# Patient Record
Sex: FEMALE | Race: WHITE | Employment: OTHER | URBAN - METROPOLITAN AREA
[De-identification: names, ages, dates, MRNs, and addresses within clinical notes are randomized per-mention and may not be internally consistent; named-entity substitution may affect disease eponyms.]

---

## 2017-01-16 RX ORDER — PIOGLITAZONE HCL AND METFORMIN HCL 500; 15 MG/1; MG/1
TABLET ORAL
Qty: 180 TAB | Refills: 0 | Status: SHIPPED | OUTPATIENT
Start: 2017-01-16 | End: 2017-03-31 | Stop reason: SDUPTHER

## 2017-01-16 RX ORDER — LOSARTAN POTASSIUM 25 MG/1
TABLET ORAL
Qty: 90 TAB | Refills: 0 | Status: SHIPPED | OUTPATIENT
Start: 2017-01-16 | End: 2017-03-31 | Stop reason: SDUPTHER

## 2017-01-16 RX ORDER — LEVOTHYROXINE SODIUM 75 UG/1
TABLET ORAL
Qty: 90 TAB | Refills: 0 | Status: SHIPPED | OUTPATIENT
Start: 2017-01-16 | End: 2017-03-31 | Stop reason: SDUPTHER

## 2017-03-31 ENCOUNTER — OFFICE VISIT (OUTPATIENT)
Dept: FAMILY MEDICINE CLINIC | Age: 62
End: 2017-03-31

## 2017-03-31 VITALS
TEMPERATURE: 97.2 F | OXYGEN SATURATION: 99 % | SYSTOLIC BLOOD PRESSURE: 132 MMHG | RESPIRATION RATE: 20 BRPM | HEIGHT: 64 IN | WEIGHT: 168.2 LBS | HEART RATE: 81 BPM | DIASTOLIC BLOOD PRESSURE: 80 MMHG | BODY MASS INDEX: 28.71 KG/M2

## 2017-03-31 DIAGNOSIS — E03.4 HYPOTHYROIDISM DUE TO ACQUIRED ATROPHY OF THYROID: ICD-10-CM

## 2017-03-31 DIAGNOSIS — E78.00 PURE HYPERCHOLESTEROLEMIA: ICD-10-CM

## 2017-03-31 DIAGNOSIS — E11.49 OTHER DIABETIC NEUROLOGICAL COMPLICATION ASSOCIATED WITH TYPE 2 DIABETES MELLITUS (HCC): Primary | ICD-10-CM

## 2017-03-31 DIAGNOSIS — I10 ESSENTIAL HYPERTENSION: ICD-10-CM

## 2017-03-31 DIAGNOSIS — M54.2 NECK PAIN: ICD-10-CM

## 2017-03-31 RX ORDER — LOSARTAN POTASSIUM 25 MG/1
TABLET ORAL
Qty: 90 TAB | Refills: 2 | Status: SHIPPED | OUTPATIENT
Start: 2017-03-31 | End: 2017-08-25 | Stop reason: ALTCHOICE

## 2017-03-31 RX ORDER — LEVOTHYROXINE SODIUM 75 UG/1
TABLET ORAL
Qty: 90 TAB | Refills: 2 | Status: SHIPPED | OUTPATIENT
Start: 2017-03-31 | End: 2018-02-08 | Stop reason: SDUPTHER

## 2017-03-31 RX ORDER — PIOGLITAZONE HCL AND METFORMIN HCL 500; 15 MG/1; MG/1
TABLET ORAL
Qty: 180 TAB | Refills: 2 | Status: SHIPPED | OUTPATIENT
Start: 2017-03-31 | End: 2017-08-25 | Stop reason: ALTCHOICE

## 2017-03-31 RX ORDER — SIMVASTATIN 40 MG/1
TABLET, FILM COATED ORAL
Qty: 90 TAB | Refills: 2 | Status: SHIPPED | OUTPATIENT
Start: 2017-03-31 | End: 2018-04-11 | Stop reason: SDUPTHER

## 2017-03-31 NOTE — PATIENT INSTRUCTIONS

## 2017-03-31 NOTE — PROGRESS NOTES
Sofie Degroot is a 62 y.o. female  Chief Complaint   Patient presents with    Diabetes     4 months follow up     1. Have you been to the ER, urgent care clinic since your last visit? Hospitalized since your last visit? No    2. Have you seen or consulted any other health care providers outside of the [address] [place] [address] since your last visit? Include any pap smears or colon screening.  No

## 2017-03-31 NOTE — MR AVS SNAPSHOT
Visit Information Date & Time Provider Department Dept. Phone Encounter #  
 3/31/2017  8:30 AM Claudeen Figures, [employer] [phone] [id number] Upcoming Health Maintenance Date Due Hepatitis C Screening 1955 FOOT EXAM Q1 6/23/2016 ZOSTER VACCINE AGE 60> 12/12/2016 EYE EXAM RETINAL OR DILATED Q1 3/16/2017 HEMOGLOBIN A1C Q6M 5/3/2017 BREAST CANCER SCRN MAMMOGRAM 8/14/2017 MICROALBUMIN Q1 11/3/2017 LIPID PANEL Q1 11/3/2017 PAP AKA CERVICAL CYTOLOGY 10/5/2019 COLONOSCOPY 8/23/2023 DTaP/Tdap/Td series (2 - Td) 6/23/2025 Allergies as of 3/31/2017  Review Complete On: 3/31/2017 By: Leonidas Carmichael LPN Severity Noted Reaction Type Reactions Ace Inhibitors High 10/20/2010   Systemic Anaphylaxis Anectine [Succinylcholine Chloride] High 10/20/2010    Shortness of Breath With rash Iodinated Contrast Media - Oral And Iv Dye High 10/20/2010   Systemic Hives Vicodin [Hydrocodone-acetaminophen]  10/14/2014    Itching Current Immunizations  Reviewed on 6/23/2015 Name Date Influenza Vaccine 10/29/2015 Influenza Vaccine (Quad) PF 11/15/2016  3:57 PM  
 Influenza Vaccine Split 11/1/2012 Pneumococcal Polysaccharide (PPSV-23) 11/15/2016  3:56 PM  
 Tdap 6/23/2015  3:43 PM  
  
 Not reviewed this visit You Were Diagnosed With   
  
 Codes Comments Essential hypertension    -  Primary ICD-10-CM: I10 
ICD-9-CM: 401.9 Pure hypercholesterolemia     ICD-10-CM: E78.00 ICD-9-CM: 272.0 Hypothyroidism due to acquired atrophy of thyroid     ICD-10-CM: E03.4 ICD-9-CM: 244.8, 246.8 Neck pain     ICD-10-CM: M54.2 ICD-9-CM: 723.1 Other diabetic neurological complication associated with type 2 diabetes mellitus ([facility]     ICD-10-CM: E11.49 Vitals BP Pulse Temp Resp Height(growth percentile) Weight(growth percentile) 132/80 (BP 1 Location: Right arm, BP Patient Position: Sitting) 81 97.2 °F (36.2 °C) (Temporal) 20 5' 4\" (1.626 m) 168 lb 3.2 oz (76.3 kg) LMP SpO2 BMI OB Status Smoking Status 10/16/2010 99% 28.87 kg/m2 Hysterectomy Former Smoker Vitals History BMI and BSA Data Body Mass Index Body Surface Area  
 28.87 kg/m 2 1.86 m 2 Preferred Pharmacy Pharmacy Name Phone [address] [facility] [address]  [place] [facility] [phone] Your Updated Medication List  
  
   
This list is accurate as of: 3/31/17  9:05 AM.  Always use your most recent med list.  
  
  
  
  
 * albuterol 90 mcg/actuation inhaler Commonly known as:  PROAIR HFA Take 2 Puffs by inhalation every six (6) hours as needed for Wheezing or Shortness of Breath. * albuterol 90 mcg/actuation inhaler Commonly known as:  PROVENTIL HFA, VENTOLIN HFA, PROAIR HFA Take 2 Puffs by inhalation every six (6) hours as needed for Wheezing. aspirin 81 mg tablet Take  by mouth daily. CINNAMON 500 mg Cap Generic drug:  cinnamon bark Take  by mouth. furosemide 20 mg tablet Commonly known as:  LASIX Take 1 Tab by mouth daily as needed. gabapentin 300 mg capsule Commonly known as:  NEURONTIN  
TAKE 2 CAPSULES BY MOUTH THREE TIMES DAILY  
  
 levothyroxine 75 mcg tablet Commonly known as:  SYNTHROID  
TAKE 1 TABLET BY MOUTH DAILY BEFORE BREAKFAST  
  
 losartan 25 mg tablet Commonly known as:  COZAAR  
TAKE 1 TABLET BY MOUTH DAILY pioglitazone-metFORMIN  mg per tablet Commonly known as:  ACTOPLUS MET  
TAKE 1 TABLET BY MOUTH TWICE DAILY WITH MEALS  
  
 PRILOSEC PO Take  by mouth. simvastatin 40 mg tablet Commonly known as:  ZOCOR  
TAKE 1 TABLET BY MOUTH EVERY EVENING  
  
 TURMERIC ROOT EXTRACT PO Take  by mouth. VITAMIN B-12 2,500 mcg sublingual tablet Generic drug:  cyanocobalamin Take 2,500 mcg by mouth daily. * Notice: This list has 2 medication(s) that are the same as other medications prescribed for you. Read the directions carefully, and ask your doctor or other care provider to review them with you. Prescriptions Sent to Pharmacy Refills  
 pioglitazone-metFORMIN (ACTOPLUS MET)  mg per tablet 2 Sig: TAKE 1 TABLET BY MOUTH TWICE DAILY WITH MEALS Class: Normal  
 Pharmacy: [facility] [place] [id number] [facility] [address] [facility] [place] Ph #: [phone]  
 levothyroxine (SYNTHROID) 75 mcg tablet 2 Sig: TAKE 1 TABLET BY MOUTH DAILY BEFORE BREAKFAST Class: Normal  
 Pharmacy: [facility] [place] [address] [facility] [address] [facility] [address] & [facility] [place] Ph #: [phone]  
 simvastatin (ZOCOR) 40 mg tablet 2 Sig: TAKE 1 TABLET BY MOUTH EVERY EVENING Class: Normal  
 Pharmacy: [facility] [address] [facility] [address] & [facility] [place] Ph #: [phone]  
 losartan (COZAAR) 25 mg tablet 2 Sig: TAKE 1 TABLET BY MOUTH DAILY Class: Normal  
 Pharmacy: [facility] [address] [place], [address] Ph #: [phone] We Performed the Following  DIABETES FOOT EXAM [HM7 Custom] REFERRAL TO OPHTHALMOLOGY [REF57 Custom] Comments:  
 DO NOT SCHEDULE. Pt will make appt with [facility]. Referral Information Referral ID Referred By Referred To  
  
 [id number] [facility] [address] [place], [address] Visits Status Start Date End Date 1 New Request 3/31/17 3/31/18 If your referral has a status of pending review or denied, additional information will be sent to support the outcome of this decision. Patient Instructions Learning About Diabetes Food Guidelines Your Care Instructions Meal planning is important to manage diabetes. It helps keep your blood sugar at a target level (which you set with your doctor). You don't have to eat special foods. You can eat what your family eats, including sweets once in a while. But you do have to pay attention to how often you eat and how much you eat of certain foods. You may want to work with a dietitian or a certified diabetes educator (CDE) to help you plan meals and snacks. A dietitian or CDE can also help you lose weight if that is one of your goals. What should you know about eating carbs? Managing the amount of carbohydrate (carbs) you eat is an important part of healthy meals when you have diabetes. Carbohydrate is found in many foods. · Learn which foods have carbs. And learn the amounts of carbs in different foods. ¨ Bread, cereal, pasta, and rice have about 15 grams of carbs in a serving. A serving is 1 slice of bread (1 ounce), ½ cup of cooked cereal, or 1/3 cup of cooked pasta or rice. ¨ Fruits have 15 grams of carbs in a serving. A serving is 1 small fresh fruit, such as an apple or orange; ½ of a banana; ½ cup of cooked or canned fruit; ½ cup of fruit juice; 1 cup of melon or raspberries; or 2 tablespoons of dried fruit. ¨ Milk and no-sugar-added yogurt have 15 grams of carbs in a serving. A serving is 1 cup of milk or 2/3 cup of no-sugar-added yogurt. ¨ Starchy vegetables have 15 grams of carbs in a serving. A serving is ½ cup of mashed potatoes or sweet potato; 1 cup winter squash; ½ of a small baked potato; ½ cup of cooked beans; or ½ cup cooked corn or green peas. · Learn how much carbs to eat each day and at each meal. A dietitian or CDE can teach you how to keep track of the amount of carbs you eat. This is called carbohydrate counting.  
· If you are not sure how to count carbohydrate grams, use the Plate Method to plan meals. It is a good, quick way to make sure that you have a balanced meal. It also helps you spread carbs throughout the day. ¨ Divide your plate by types of foods. Put non-starchy vegetables on half the plate, meat or other protein food on one-quarter of the plate, and a grain or starchy vegetable in the final quarter of the plate. To this you can add a small piece of fruit and 1 cup of milk or yogurt, depending on how many carbs you are supposed to eat at a meal. 
· Try to eat about the same amount of carbs at each meal. Do not \"save up\" your daily allowance of carbs to eat at one meal. 
· Proteins have very little or no carbs per serving. Examples of proteins are beef, chicken, turkey, fish, eggs, tofu, cheese, cottage cheese, and peanut butter. A serving size of meat is 3 ounces, which is about the size of a deck of cards. Examples of meat substitute serving sizes (equal to 1 ounce of meat) are 1/4 cup of cottage cheese, 1 egg, 1 tablespoon of peanut butter, and ½ cup of tofu. How can you eat out and still eat healthy? · Learn to estimate the serving sizes of foods that have carbohydrate. If you measure food at home, it will be easier to estimate the amount in a serving of restaurant food. · If the meal you order has too much carbohydrate (such as potatoes, corn, or baked beans), ask to have a low-carbohydrate food instead. Ask for a salad or green vegetables. · If you use insulin, check your blood sugar before and after eating out to help you plan how much to eat in the future. · If you eat more carbohydrate at a meal than you had planned, take a walk or do other exercise. This will help lower your blood sugar. What else should you know? · Limit saturated fat, such as the fat from meat and dairy products. This is a healthy choice because people who have diabetes are at higher risk of heart disease.  So choose lean cuts of meat and nonfat or low-fat dairy products. Use olive or canola oil instead of butter or shortening when cooking. · Don't skip meals. Your blood sugar may drop too low if you skip meals and take insulin or certain medicines for diabetes. · Check with your doctor before you drink alcohol. Alcohol can cause your blood sugar to drop too low. Alcohol can also cause a bad reaction if you take certain diabetes medicines. Follow-up care is a key part of your treatment and safety. Be sure to make and go to all appointments, and call your doctor if you are having problems. It's also a good idea to know your test results and keep a list of the medicines you take. Where can you learn more? Go to http://junito-grace.info/. Enter Q944 in the search box to learn more about \"Learning About Diabetes Food Guidelines. \" Current as of: May 23, 2016 Content Version: 11.2 © 5323-3099 [employer]. Care instructions adapted under license by [employer] (which disclaims liability or warranty for this information). If you have questions about a medical condition or this instruction, always ask your healthcare professional. [address] any warranty or liability for your use of this information. Introducing [facility]! Quynh Kan introduces [employer] patient portal. Now you can access parts of your medical record, email your doctor's office, and request medication refills online. 1. In your internet browser, go to [email or web address] 2. Click on the First Time User? Click Here link in the Sign In box. You will see the New Member Sign Up page. 3. Enter your [employer] Access Code exactly as it appears below. You will not need to use this code after youve completed the sign-up process. If you do not sign up before the expiration date, you must request a new code. · [employer] Access Code: [email or web address] Expires: 6/29/2017  9:05 AM 
 
 4. Enter the last four digits of your Social Security Number (xxxx) and Date of Birth (mm/dd/yyyy) as indicated and click Submit. You will be taken to the next sign-up page. 5. Create a Reasult ID. This will be your Reasult login ID and cannot be changed, so think of one that is secure and easy to remember. 6. Create a Reasult password. You can change your password at any time. 7. Enter your Password Reset Question and Answer. This can be used at a later time if you forget your password. 8. Enter your e-mail address. You will receive e-mail notification when new information is available in [address] 9. Click Sign Up. You can now view and download portions of your medical record. 10. Click the Download Summary menu link to download a portable copy of your medical information. If you have questions, please visit the Frequently Asked Questions section of the Reasult website. Remember, Reasult is NOT to be used for urgent needs. For medical emergencies, dial 911. Now available from your iPhone and Android! Please provide this summary of care documentation to your next provider. Your primary care clinician is listed as Steph 51. If you have any questions after today's visit, please call [phone].

## 2017-04-01 LAB
AVG GLU, 10930: 139 MG/DL (ref 91–123)
HBA1C MFR BLD HPLC: 6.5 % (ref 4.8–5.9)
HCV AB SER IA-ACNC: NORMAL

## 2017-04-04 ENCOUNTER — TELEPHONE (OUTPATIENT)
Dept: FAMILY MEDICINE CLINIC | Age: 62
End: 2017-04-04

## 2017-08-25 ENCOUNTER — OFFICE VISIT (OUTPATIENT)
Dept: FAMILY MEDICINE CLINIC | Age: 62
End: 2017-08-25

## 2017-08-25 VITALS
WEIGHT: 165 LBS | BODY MASS INDEX: 28.17 KG/M2 | SYSTOLIC BLOOD PRESSURE: 120 MMHG | OXYGEN SATURATION: 99 % | DIASTOLIC BLOOD PRESSURE: 80 MMHG | RESPIRATION RATE: 16 BRPM | TEMPERATURE: 98.1 F | HEART RATE: 66 BPM | HEIGHT: 64 IN

## 2017-08-25 DIAGNOSIS — G89.29 CHRONIC LEFT SHOULDER PAIN: ICD-10-CM

## 2017-08-25 DIAGNOSIS — E11.49 OTHER DIABETIC NEUROLOGICAL COMPLICATION ASSOCIATED WITH TYPE 2 DIABETES MELLITUS (HCC): ICD-10-CM

## 2017-08-25 DIAGNOSIS — M25.60 JOINT MOVEMENT RESTRICTED: ICD-10-CM

## 2017-08-25 DIAGNOSIS — M19.012 OSTEOARTHRITIS OF LEFT SHOULDER, UNSPECIFIED OSTEOARTHRITIS TYPE: ICD-10-CM

## 2017-08-25 DIAGNOSIS — M25.512 CHRONIC LEFT SHOULDER PAIN: ICD-10-CM

## 2017-08-25 DIAGNOSIS — E78.00 PURE HYPERCHOLESTEROLEMIA: ICD-10-CM

## 2017-08-25 DIAGNOSIS — E11.49 OTHER DIABETIC NEUROLOGICAL COMPLICATION ASSOCIATED WITH TYPE 2 DIABETES MELLITUS (HCC): Primary | ICD-10-CM

## 2017-08-25 DIAGNOSIS — I10 ESSENTIAL HYPERTENSION: ICD-10-CM

## 2017-08-25 LAB — HBA1C MFR BLD HPLC: 5.9 %

## 2017-08-25 RX ORDER — GABAPENTIN 300 MG/1
CAPSULE ORAL
Qty: 180 CAP | Refills: 5 | Status: SHIPPED | OUTPATIENT
Start: 2017-08-25 | End: 2018-03-02 | Stop reason: SDUPTHER

## 2017-08-25 NOTE — PATIENT INSTRUCTIONS

## 2017-08-25 NOTE — PROGRESS NOTES
Assessment/Plan:    *Diagnoses and all orders for this visit:    1. Other diabetic neurological complication associated with type 2 diabetes mellitus (HCC)  -     AMB POC HEMOGLOBIN A1C  -     CBC WITH AUTOMATED DIFF; Future  -     HEPATIC FUNCTION PANEL; Future  -     LIPID PANEL; Future  -     METABOLIC PANEL, BASIC; Future  -     TSH 3RD GENERATION; Future  -     T4, FREE; Future  -     URINALYSIS W/ RFLX MICROSCOPIC; Future  -     VITAMIN D, 25 HYDROXY; Future  -     HEMOGLOBIN A1C W/O EAG; Future  -     MICROALBUMIN, UR, RAND W/ MICROALBUMIN/CREA RATIO; Future    2. Essential hypertension    3. Pure hypercholesterolemia    4. Joint movement restricted  -     XR SHOULDER LT AP/LAT MIN 2 V; Future  -     MRI SHOULDER LT WO CONT; Future    5. Chronic left shoulder pain  -     XR SHOULDER LT AP/LAT MIN 2 V; Future  -     MRI SHOULDER LT WO CONT; Future    Other orders  -     gabapentin (NEURONTIN) 300 mg capsule; TAKE 2 CAPSULES BY MOUTH THREE TIMES DAILY        Physical in Nov.  BW prior. Will await shoulder imaging results. Then refer if needed to Ortho. The plan was discussed with the patient. The patient verbalized understanding and is in agreement with the plan. All medication potential side effects were discussed with the patient.    -------------------------------------------------------------------------------------------------------------------        Enrique Elliott is a 62 y.o. female and presents with Diabetes and Hypertension         Subjective:  DM: well controlled. A1c 5.9%. Excellent. HTN: controlled. HLD: stable. Has been having episodes of nausea, bloating vomiting, at least 2 episodes a month for the last 3 months. These 3 mon have been very stressful for her. Has issues with reflux but she has had this for years and is not always associated with this. She has not had any symptoms the last month but also her stress levels are better.     Also she mentions the fall she had back in March, was seen here for neck pain. Since then, she has noted a chronic pain in her LT shoulder that goes across to the chest area. Mammogram was normal and done recently. She has restricted shoulder movement, activities are affected by this pain. Can not     ROS:  Constitutional: No recent weight change. No weakness/fatigue. No f/c. Skin: No rashes, change in nails/hair, itching   HENT: No HA, dizziness. No hearing loss/tinnitus. No nasal congestion/discharge. Eyes: No change in vision, double/blurred vision or eye pain/redness. Cardiovascular: No CP/palpitations. No LIANG/orthopnea/PND. Respiratory: No cough/sputum, dyspnea, wheezing. Gastointestinal: No dysphagia, reflux. No n/v. No constipation/diarrhea. No melena/rectal bleeding. Genitourinary: No dysuria, urinary hesitancy, nocturia, hematuria. No incontinence. Musculoskeletal: No joint pain/stiffness. No muscle pain/tenderness. Endo: No heat/cold intolerance, no polyuria/polydypsia. Heme: No h/o anemia. No easy bleeding/bruising. Allergy/Immunology: No seasonal rhinitis. Denies frequent colds, sinus/ear infections. Neurological: No seizures/numbness/weakness. No paresthesias. Psychiatric:  No depression, anxiety. The problem list was updated as a part of today's visit. Patient Active Problem List   Diagnosis Code    HTN (hypertension) I10    Hyperlipidemia E78.5    Goiter E04.9    Sleep apnea G47.30    GERD (gastroesophageal reflux disease) K21.9    Macular degeneration H35.30    Fatty liver K76.0    Allergic rhinitis J30.9    Family history of colon cancer Z80.0    Peripheral neuropathy ([facility] G62.9    Hypothyroidism E03.9    Microalbuminuria due to type 2 diabetes mellitus ([address] E11.29, R80.9    Diabetic neuropathy ([address] E11.40       The PSH, FH were reviewed.         SH:  Social History   Substance Use Topics    Smoking status: Former Smoker     Packs/day: 0.10     Years: 0.50     Types: Cigarettes     Quit date: 1/1/1975    Smokeless tobacco: Never Used    Alcohol use Yes      Comment: 3 glasses of wine per year       Medications/Allergies:  Current Outpatient Prescriptions on File Prior to Visit   Medication Sig Dispense Refill    pioglitazone-metFORMIN (ACTOPLUS MET)  mg per tablet TAKE 1 TABLET BY MOUTH TWICE DAILY WITH MEALS 180 Tab 1    losartan (COZAAR) 25 mg tablet TAKE 1 TABLET BY MOUTH DAILY 90 Tab 1    levothyroxine (SYNTHROID) 75 mcg tablet TAKE 1 TABLET BY MOUTH DAILY BEFORE BREAKFAST 90 Tab 2    simvastatin (ZOCOR) 40 mg tablet TAKE 1 TABLET BY MOUTH EVERY EVENING 90 Tab 2    OMEPRAZOLE (PRILOSEC PO) Take  by mouth.  cyanocobalamin (VITAMIN B-12) 2,500 mcg sublingual tablet Take 2,500 mcg by mouth daily.  cinnamon bark (CINNAMON) 500 mg cap Take  by mouth.  albuterol (PROAIR HFA) 90 mcg/actuation inhaler Take 2 Puffs by inhalation every six (6) hours as needed for Wheezing or Shortness of Breath. 1 Inhaler 3    furosemide (LASIX) 20 mg tablet Take 1 Tab by mouth daily as needed. 60 Tab 2    aspirin 81 mg tablet Take  by mouth daily. No current facility-administered medications on file prior to visit.          Allergies   Allergen Reactions    Ace Inhibitors Anaphylaxis    Anectine [Succinylcholine Chloride] Shortness of Breath     With rash    Iodinated Contrast- Oral And Iv Dye Hives    Vicodin [Hydrocodone-Acetaminophen] Itching         Health Maintenance:   Health Maintenance   Topic Date Due    ZOSTER VACCINE AGE 60>  10/12/2016    EYE EXAM RETINAL OR DILATED Q1  03/16/2017    INFLUENZA AGE 9 TO ADULT  08/01/2017    BREAST CANCER SCRN MAMMOGRAM  08/14/2017    HEMOGLOBIN A1C Q6M  09/30/2017    MICROALBUMIN Q1  11/03/2017    LIPID PANEL Q1  11/03/2017    FOOT EXAM Q1  03/31/2018    PAP AKA CERVICAL CYTOLOGY  10/05/2019    COLONOSCOPY  08/23/2023    DTaP/Tdap/Td series (2 - Td) 06/23/2025    Hepatitis C Screening  Completed    Pneumococcal 19-64 Medium Risk  Completed       Objective:  Visit Vitals    /80 (BP 1 Location: Left arm, BP Patient Position: Sitting)    Pulse 66    Temp 98.1 °F (36.7 °C) (Oral)    Resp 16    Ht 5' 4\" (1.626 m)    Wt 165 lb (74.8 kg)    LMP 10/16/2010    SpO2 99%    BMI 28.32 kg/m2          Nurses notes and VS reviewed. Physical Examination: General appearance - alert, well appearing, and in no distress  Chest - clear to auscultation, no wheezes, rales or rhonchi, symmetric air entry  Heart - normal rate, regular rhythm, normal S1, S2, no murmurs, rubs, clicks or gallops  Musculoskeletal - abnormal exam of left shoulder, swelling around the LT clavicle, TTP in areas, restricted arm raising can         Labwork and Ancillary Studies:    CBC w/Diff  Lab Results   Component Value Date/Time    WBC 8.9 11/03/2016 07:12 AM    HGB 11.3 11/03/2016 07:12 AM    PLATELET 341 35/19/1095 07:12 AM         Basic Metabolic Profile  Lab Results   Component Value Date/Time    Sodium 138 11/03/2016 07:12 AM    Potassium 4.5 11/03/2016 07:12 AM    Chloride 98 11/03/2016 07:12 AM    CO2 25 11/03/2016 07:12 AM    Anion gap 15.0 11/03/2016 07:12 AM    Glucose 95 11/03/2016 07:12 AM    BUN 10 11/03/2016 07:12 AM    Creatinine 0.6 11/03/2016 07:12 AM    Calcium 9.6 11/03/2016 07:12 AM         LFT  Lab Results   Component Value Date/Time    ALT (SGPT) 15 11/03/2016 07:12 AM    AST (SGOT) 19 11/03/2016 07:12 AM    Alk.  phosphatase 77 11/03/2016 07:12 AM    Bilirubin, direct <0.2 11/03/2016 07:12 AM    Bilirubin, total 0.3 11/03/2016 07:12 AM         Cholesterol  Lab Results   Component Value Date/Time    Cholesterol, total 174 11/03/2016 07:12 AM    HDL Cholesterol 65 11/03/2016 07:12 AM    LDL, calculated 82 11/03/2016 07:12 AM    Triglyceride 137 11/03/2016 07:12 AM

## 2017-08-25 NOTE — MR AVS SNAPSHOT
Visit Information Date & Time Provider Department Dept. Phone Encounter #  
 8/25/2017  7:30 AM Isabela Onesimo, 3 [facility] [phone] [id number] Upcoming Health Maintenance Date Due ZOSTER VACCINE AGE 60> 10/12/2016 EYE EXAM RETINAL OR DILATED Q1 3/16/2017 INFLUENZA AGE 9 TO ADULT 8/1/2017 BREAST CANCER SCRN MAMMOGRAM 8/14/2017 HEMOGLOBIN A1C Q6M 9/30/2017 MICROALBUMIN Q1 11/3/2017 LIPID PANEL Q1 11/3/2017 FOOT EXAM Q1 3/31/2018 PAP AKA CERVICAL CYTOLOGY 10/5/2019 COLONOSCOPY 8/23/2023 DTaP/Tdap/Td series (2 - Td) 6/23/2025 Allergies as of 8/25/2017  Review Complete On: 8/25/2017 By: Isabela Echols MD  
  
 Severity Noted Reaction Type Reactions Ace Inhibitors High 10/20/2010   Systemic Anaphylaxis Anectine [Succinylcholine Chloride] High 10/20/2010    Shortness of Breath With rash Iodinated Contrast- Oral And Iv Dye High 10/20/2010   Systemic Hives Vicodin [Hydrocodone-acetaminophen]  10/14/2014    Itching Current Immunizations  Reviewed on 8/25/2017 Name Date Influenza Vaccine 10/29/2015 Influenza Vaccine (Quad) PF 11/15/2016  3:57 PM  
 Influenza Vaccine Split 11/1/2012 Pneumococcal Polysaccharide (PPSV-23) 11/15/2016  3:56 PM  
 Tdap 6/23/2015  3:43 PM  
  
 Reviewed by Isabela Echols MD on 8/25/2017 at  8:00 AM  
You Were Diagnosed With   
  
 Codes Comments Other diabetic neurological complication associated with type 2 diabetes mellitus ([facility] [address]    -  Primary ICD-10-CM: E11.49 
ICD-9-CM: 250.60 Essential hypertension     ICD-10-CM: I10 
ICD-9-CM: 401.9 Pure hypercholesterolemia     ICD-10-CM: E78.00 ICD-9-CM: 272.0 Joint movement restricted     ICD-10-CM: R29.898 ICD-9-CM: 719.50 Chronic left shoulder pain     ICD-10-CM: M25.512, G89.29 ICD-9-CM: 719.41, 338.29 Vitals BP Pulse Temp Resp Height(growth percentile) Weight(growth percentile) 120/80 (BP 1 Location: Left arm, BP Patient Position: Sitting) 66 98.1 °F (36.7 °C) (Oral) 16 5' 4\" (1.626 m) 165 lb (74.8 kg) LMP SpO2 BMI OB Status Smoking Status 10/16/2010 99% 28.32 kg/m2 Hysterectomy Former Smoker BMI and BSA Data Body Mass Index Body Surface Area  
 28.32 kg/m 2 1.84 m 2 Preferred Pharmacy Pharmacy Name Phone [address] [facility], [facility] [address] AT [address] [place] [address] NECK [phone] Your Updated Medication List  
  
   
This list is accurate as of: 8/25/17  8:23 AM.  Always use your most recent med list.  
  
  
  
  
 albuterol 90 mcg/actuation inhaler Commonly known as:  PROAIR HFA Take 2 Puffs by inhalation every six (6) hours as needed for Wheezing or Shortness of Breath. aspirin 81 mg tablet Take  by mouth daily. CINNAMON 500 mg Cap Generic drug:  cinnamon bark Take  by mouth. furosemide 20 mg tablet Commonly known as:  LASIX Take 1 Tab by mouth daily as needed. gabapentin 300 mg capsule Commonly known as:  NEURONTIN  
TAKE 2 CAPSULES BY MOUTH THREE TIMES DAILY  
  
 levothyroxine 75 mcg tablet Commonly known as:  SYNTHROID  
TAKE 1 TABLET BY MOUTH DAILY BEFORE BREAKFAST  
  
 losartan 25 mg tablet Commonly known as:  COZAAR  
TAKE 1 TABLET BY MOUTH DAILY pioglitazone-metFORMIN  mg per tablet Commonly known as:  ACTOPLUS MET  
TAKE 1 TABLET BY MOUTH TWICE DAILY WITH MEALS  
  
 PRILOSEC PO Take  by mouth. simvastatin 40 mg tablet Commonly known as:  ZOCOR  
TAKE 1 TABLET BY MOUTH EVERY EVENING  
  
 VITAMIN B-12 2,500 mcg sublingual tablet Generic drug:  cyanocobalamin Take 2,500 mcg by mouth daily. Prescriptions Sent to Pharmacy Refills  
 gabapentin (NEURONTIN) 300 mg capsule 5 Sig: TAKE 2 CAPSULES BY MOUTH THREE TIMES DAILY  Class: Normal  
 Pharmacy: [facility] [place] [place], [address]  #: [phone] We Performed the Following AMB POC HEMOGLOBIN A1C [36289 CPT(R)] To-Do List   
 08/25/2017 Lab:  CBC WITH AUTOMATED DIFF   
  
 08/25/2017 Lab:  HEMOGLOBIN A1C W/O EAG   
  
 08/25/2017 Lab:  HEPATIC FUNCTION PANEL   
  
 08/25/2017 Lab:  LIPID PANEL   
  
 08/25/2017 Lab:  METABOLIC PANEL, BASIC   
  
 08/25/2017 Lab:  MICROALBUMIN, UR, RAND W/ MICROALBUMIN/CREA RATIO   
  
 08/25/2017 Imaging:  MRI SHOULDER LT WO CONT   
  
 08/25/2017 Lab:  T4, FREE   
  
 08/25/2017 Lab:  TSH 3RD GENERATION   
  
 08/25/2017 Lab:  URINALYSIS W/ RFLX MICROSCOPIC   
  
 08/25/2017 Lab:  VITAMIN D, 25 HYDROXY   
  
 08/25/2017 Imaging:  XR SHOULDER LT AP/LAT MIN 2 V Patient Instructions Learning About Diabetes Food Guidelines Your Care Instructions Meal planning is important to manage diabetes. It helps keep your blood sugar at a target level (which you set with your doctor). You don't have to eat special foods. You can eat what your family eats, including sweets once in a while. But you do have to pay attention to how often you eat and how much you eat of certain foods. You may want to work with a dietitian or a certified diabetes educator (CDE) to help you plan meals and snacks. A dietitian or CDE can also help you lose weight if that is one of your goals. What should you know about eating carbs? Managing the amount of carbohydrate (carbs) you eat is an important part of healthy meals when you have diabetes. Carbohydrate is found in many foods. · Learn which foods have carbs. And learn the amounts of carbs in different foods. ¨ Bread, cereal, pasta, and rice have about 15 grams of carbs in a serving. A serving is 1 slice of bread (1 ounce), ½ cup of cooked cereal, or 1/3 cup of cooked pasta or rice. ¨ Fruits have 15 grams of carbs in a serving. A serving is 1 small fresh fruit, such as an apple or orange; ½ of a banana; ½ cup of cooked or canned fruit; ½ cup of fruit juice; 1 cup of melon or raspberries; or 2 tablespoons of dried fruit. ¨ Milk and no-sugar-added yogurt have 15 grams of carbs in a serving. A serving is 1 cup of milk or 2/3 cup of no-sugar-added yogurt. ¨ Starchy vegetables have 15 grams of carbs in a serving. A serving is ½ cup of mashed potatoes or sweet potato; 1 cup winter squash; ½ of a small baked potato; ½ cup of cooked beans; or ½ cup cooked corn or green peas. · Learn how much carbs to eat each day and at each meal. A dietitian or CDE can teach you how to keep track of the amount of carbs you eat. This is called carbohydrate counting. · If you are not sure how to count carbohydrate grams, use the Plate Method to plan meals. It is a good, quick way to make sure that you have a balanced meal. It also helps you spread carbs throughout the day. ¨ Divide your plate by types of foods. Put non-starchy vegetables on half the plate, meat or other protein food on one-quarter of the plate, and a grain or starchy vegetable in the final quarter of the plate. To this you can add a small piece of fruit and 1 cup of milk or yogurt, depending on how many carbs you are supposed to eat at a meal. 
· Try to eat about the same amount of carbs at each meal. Do not \"save up\" your daily allowance of carbs to eat at one meal. 
· Proteins have very little or no carbs per serving. Examples of proteins are beef, chicken, turkey, fish, eggs, tofu, cheese, cottage cheese, and peanut butter. A serving size of meat is 3 ounces, which is about the size of a deck of cards. Examples of meat substitute serving sizes (equal to 1 ounce of meat) are 1/4 cup of cottage cheese, 1 egg, 1 tablespoon of peanut butter, and ½ cup of tofu. How can you eat out and still eat healthy? · Learn to estimate the serving sizes of foods that have carbohydrate. If you measure food at home, it will be easier to estimate the amount in a serving of restaurant food. · If the meal you order has too much carbohydrate (such as potatoes, corn, or baked beans), ask to have a low-carbohydrate food instead. Ask for a salad or green vegetables. · If you use insulin, check your blood sugar before and after eating out to help you plan how much to eat in the future. · If you eat more carbohydrate at a meal than you had planned, take a walk or do other exercise. This will help lower your blood sugar. What else should you know? · Limit saturated fat, such as the fat from meat and dairy products. This is a healthy choice because people who have diabetes are at higher risk of heart disease. So choose lean cuts of meat and nonfat or low-fat dairy products. Use olive or canola oil instead of butter or shortening when cooking. · Don't skip meals. Your blood sugar may drop too low if you skip meals and take insulin or certain medicines for diabetes. · Check with your doctor before you drink alcohol. Alcohol can cause your blood sugar to drop too low. Alcohol can also cause a bad reaction if you take certain diabetes medicines. Follow-up care is a key part of your treatment and safety. Be sure to make and go to all appointments, and call your doctor if you are having problems. It's also a good idea to know your test results and keep a list of the medicines you take. Where can you learn more? Go to http://junito-grace.info/. Enter L596 in the search box to learn more about \"Learning About Diabetes Food Guidelines. \" Current as of: March 13, 2017 Content Version: 11.3 © 7234-1229 [employer] Care instructions adapted under license by [employer] (which disclaims liability or warranty for this information).  If you have questions about a medical condition or this instruction, always ask your healthcare professional. [address] any warranty or liability for your use of this information. Introducing [facility]! Mack Jerez introduces icanbuy patient portal. Now you can access parts of your medical record, email your doctor's office, and request medication refills online. 1. In your internet browser, go to [email or web address]://[email or web address]. Alaris/[employer] 2. Click on the First Time User? Click Here link in the Sign In box. You will see the New Member Sign Up page. 3. Enter your icanbuy Access Code exactly as it appears below. You will not need to use this code after youve completed the sign-up process. If you do not sign up before the expiration date, you must request a new code. · icanbuy Access Code: [email or web address] Expires: 11/23/2017  7:28 AM 
 
4. Enter the last four digits of your Social Security Number (xxxx) and Date of Birth (mm/dd/yyyy) as indicated and click Submit. You will be taken to the next sign-up page. 5. Create a icanbuy ID. This will be your icanbuy login ID and cannot be changed, so think of one that is secure and easy to remember. 6. Create a icanbuy password. You can change your password at any time. 7. Enter your Password Reset Question and Answer. This can be used at a later time if you forget your password. 8. Enter your e-mail address. You will receive e-mail notification when new information is available in [address] 9. Click Sign Up. You can now view and download portions of your medical record. 10. Click the Download Summary menu link to download a portable copy of your medical information. If you have questions, please visit the Frequently Asked Questions section of the icanbuy website. Remember, icanbuy is NOT to be used for urgent needs. For medical emergencies, dial 911. Now available from your iPhone and Android! Please provide this summary of care documentation to your next provider. Your primary care clinician is listed as Steph 51. If you have any questions after today's visit, please call [phone].

## 2017-09-18 ENCOUNTER — TELEPHONE (OUTPATIENT)
Dept: FAMILY MEDICINE CLINIC | Age: 62
End: 2017-09-18

## 2017-10-02 ENCOUNTER — DOCUMENTATION ONLY (OUTPATIENT)
Dept: FAMILY MEDICINE CLINIC | Age: 62
End: 2017-10-02

## 2017-10-02 NOTE — PROGRESS NOTES
Patient given Mri results shows significant DJD and a full thickness tear of one of the tendons that is quite extensive patient voiced understanding and  aware  We will refer her to [facility]

## 2017-10-17 DIAGNOSIS — M25.60 JOINT MOVEMENT RESTRICTED: ICD-10-CM

## 2017-10-17 DIAGNOSIS — M25.512 CHRONIC LEFT SHOULDER PAIN: ICD-10-CM

## 2017-10-17 DIAGNOSIS — G89.29 CHRONIC LEFT SHOULDER PAIN: ICD-10-CM

## 2017-11-06 ENCOUNTER — CLINICAL SUPPORT (OUTPATIENT)
Dept: FAMILY MEDICINE CLINIC | Age: 62
End: 2017-11-06

## 2017-11-06 DIAGNOSIS — Z23 ENCOUNTER FOR IMMUNIZATION: Primary | ICD-10-CM

## 2017-11-10 LAB
CREATININE, EXTERNAL: 83
HBA1C MFR BLD HPLC: 6.1 %
LDL-C, EXTERNAL: 75

## 2017-11-28 ENCOUNTER — OFFICE VISIT (OUTPATIENT)
Dept: FAMILY MEDICINE CLINIC | Age: 62
End: 2017-11-28

## 2017-11-28 VITALS
SYSTOLIC BLOOD PRESSURE: 142 MMHG | DIASTOLIC BLOOD PRESSURE: 84 MMHG | OXYGEN SATURATION: 97 % | HEIGHT: 64 IN | RESPIRATION RATE: 18 BRPM | BODY MASS INDEX: 28.85 KG/M2 | WEIGHT: 169 LBS | TEMPERATURE: 97.6 F | HEART RATE: 61 BPM

## 2017-11-28 DIAGNOSIS — I10 ESSENTIAL HYPERTENSION: ICD-10-CM

## 2017-11-28 DIAGNOSIS — D64.9 ANEMIA, UNSPECIFIED TYPE: ICD-10-CM

## 2017-11-28 DIAGNOSIS — D64.9 ANEMIA, UNSPECIFIED TYPE: Primary | ICD-10-CM

## 2017-11-28 DIAGNOSIS — E78.00 PURE HYPERCHOLESTEROLEMIA: ICD-10-CM

## 2017-11-28 DIAGNOSIS — E11.49 OTHER DIABETIC NEUROLOGICAL COMPLICATION ASSOCIATED WITH TYPE 2 DIABETES MELLITUS (HCC): ICD-10-CM

## 2017-11-28 DIAGNOSIS — E03.4 HYPOTHYROIDISM DUE TO ACQUIRED ATROPHY OF THYROID: ICD-10-CM

## 2017-11-28 NOTE — PATIENT INSTRUCTIONS
Anemia: Care Instructions  Your Care Instructions    Anemia is a low level of red blood cells, which carry oxygen throughout your body. Many things can cause anemia. Lack of iron is one of the most common causes. Your body needs iron to make hemoglobin, a substance in red blood cells that carries oxygen from the lungs to your body's cells. Without enough iron, the body produces fewer and smaller red blood cells. As a result, your body's cells do not get enough oxygen, and you feel tired and weak. And you may have trouble concentrating. Bleeding is the most common cause of a lack of iron. You may have heavy menstrual bleeding or bleeding caused by conditions such as ulcers, hemorrhoids, or cancer. Regular use of aspirin or other anti-inflammatory medicines (such as ibuprofen) also can cause bleeding in some people. A lack of iron in your diet also can cause anemia, especially at times when the body needs more iron, such as during pregnancy, infancy, and the teen years. Your doctor may have prescribed iron pills. It may take several months of treatment for your iron levels to return to normal. Your doctor also may suggest that you eat foods that are rich in iron, such as meat and beans. There are many other causes of anemia. It is not always due to a lack of iron. Finding the specific cause of your anemia will help your doctor find the right treatment for you. Follow-up care is a key part of your treatment and safety. Be sure to make and go to all appointments, and call your doctor if you are having problems. It's also a good idea to know your test results and keep a list of the medicines you take. How can you care for yourself at home? · Take your medicines exactly as prescribed. Call your doctor if you think you are having a problem with your medicine. · If your doctor recommends iron pills, take them as directed:  ¨ Try to take the pills on an empty stomach about 1 hour before or 2 hours after meals. But you may need to take iron with food to avoid an upset stomach. ¨ Do not take antacids or drink milk or caffeine drinks (such as coffee, tea, or cola) at the same time or within 2 hours of the time that you take your iron. They can make it hard for your body to absorb the iron. ¨ Vitamin C (from food or supplements) helps your body absorb iron. Try taking iron pills with a glass of orange juice or some other food that is high in vitamin C, such as citrus fruits. ¨ Iron pills may cause stomach problems, such as heartburn, nausea, diarrhea, constipation, and cramps. Be sure to drink plenty of fluids, and include fruits, vegetables, and fiber in your diet each day. Iron pills often make your bowel movements dark or green. ¨ If you forget to take an iron pill, do not take a double dose of iron the next time you take a pill. ¨ Keep iron pills out of the reach of small children. An overdose of iron can be very dangerous. · Follow your doctor's advice about eating iron-rich foods. These include red meat, shellfish, poultry, eggs, beans, raisins, whole-grain bread, and leafy green vegetables. · Steam vegetables to help them keep their iron content. When should you call for help? Call 911 anytime you think you may need emergency care. For example, call if:  ? · You have symptoms of a heart attack. These may include:  ¨ Chest pain or pressure, or a strange feeling in the chest.  ¨ Sweating. ¨ Shortness of breath. ¨ Nausea or vomiting. ¨ Pain, pressure, or a strange feeling in the back, neck, jaw, or upper belly or in one or both shoulders or arms. ¨ Lightheadedness or sudden weakness. ¨ A fast or irregular heartbeat. After you call 911, the  may tell you to chew 1 adult-strength or 2 to 4 low-dose aspirin. Wait for an ambulance. Do not try to drive yourself. ? · You passed out (lost consciousness).    ?Call your doctor now or seek immediate medical care if:  ? · You have new or increased shortness of breath. ? · You are dizzy or lightheaded, or you feel like you may faint. ? · Your fatigue and weakness continue or get worse. ? · You have any abnormal bleeding, such as:  ¨ Nosebleeds. ¨ Vaginal bleeding that is different (heavier, more frequent, at a different time of the month) than what you are used to. ¨ Bloody or black stools, or rectal bleeding. ¨ Bloody or pink urine. ? Watch closely for changes in your health, and be sure to contact your doctor if:  ? · You do not get better as expected. Where can you learn more? Go to http://junito-grace.info/. Enter R301 in the search box to learn more about \"Anemia: Care Instructions. \"  Current as of: October 13, 2016  Content Version: 11.4  © 0579-0526 [employer]. Care instructions adapted under license by [employer] (which disclaims liability or warranty for this information). If you have questions about a medical condition or this instruction, always ask your healthcare professional. [place] [address] any warranty or liability for your use of this information.

## 2017-11-28 NOTE — PROGRESS NOTES
Casimir Phoenix is a 62 y.o. female is here for follow up on diabetes. 1. Have you been to the ER, urgent care clinic since your last visit? Hospitalized since your last visit? No    2. Have you seen or consulted any other health care providers outside of the [address] [place] [address] since your last visit? Include any pap smears or colon screening.  MRI    Health Maintenance Due   Topic Date Due    ZOSTER VACCINE AGE 60>  10/12/2016    EYE EXAM RETINAL OR DILATED Q1  03/16/2017    BREAST CANCER SCRN MAMMOGRAM  08/14/2017    MICROALBUMIN Q1  11/03/2017    LIPID PANEL Q1  11/03/2017

## 2017-11-28 NOTE — PROGRESS NOTES
Assessment/Plan:    *Diagnoses and all orders for this visit:    1. Anemia, unspecified type  -     CBC WITH AUTOMATED DIFF; Future  -     IRON PROFILE; Future  -     FERRITIN; Future        The plan was discussed with the patient. The patient verbalized understanding and is in agreement with the plan. All medication potential side effects were discussed with the patient.    -------------------------------------------------------------------------------------------------------------------        Tamie Encinas is a 62 y.o. female and presents with Diabetes         Subjective:  Pt was supposed to schedule this visit as a physical and did not. DM: well controlled. HTN: stable. HLD: at goal.    H/H is low, has not had this in the past.  Is UTD on GI screening with colonoscopy. Has also had an EGD in the past.    Hypothyroidism: asymptomatic and TSH at goal.      ROS:  Constitutional: No recent weight change. No weakness/fatigue. No f/c. Skin: No rashes, change in nails/hair, itching   HENT: No HA, dizziness. No hearing loss/tinnitus. No nasal congestion/discharge. Eyes: No change in vision, double/blurred vision or eye pain/redness. Cardiovascular: No CP/palpitations. No LIANG/orthopnea/PND. Respiratory: No cough/sputum, dyspnea, wheezing. Gastointestinal: No dysphagia, reflux. No n/v. No constipation/diarrhea. No melena/rectal bleeding. Genitourinary: No dysuria, urinary hesitancy, nocturia, hematuria. No incontinence. Musculoskeletal: No joint pain/stiffness. No muscle pain/tenderness. Endo: No heat/cold intolerance, no polyuria/polydypsia. Heme: No h/o anemia. No easy bleeding/bruising. Allergy/Immunology: No seasonal rhinitis. Denies frequent colds, sinus/ear infections. Neurological: No seizures/numbness/weakness. No paresthesias. Psychiatric:  No depression, anxiety. The problem list was updated as a part of today's visit.   Patient Active Problem List Diagnosis Code    HTN (hypertension) I10    Hyperlipidemia E78.5    Goiter E04.9    Sleep apnea G47.30    GERD (gastroesophageal reflux disease) K21.9    Macular degeneration H35.30    Fatty liver K76.0    Allergic rhinitis J30.9    Family history of colon cancer Z80.0    Peripheral neuropathy G62.9    Hypothyroidism E03.9    Microalbuminuria due to type 2 diabetes mellitus ([facility] [address] E11.29, R80.9    Diabetic neuropathy (HCC) E11.40       The PSH, FH were reviewed. SH:  Social History   Substance Use Topics    Smoking status: Former Smoker     Packs/day: 0.10     Years: 0.50     Types: Cigarettes     Quit date: 1/1/1975    Smokeless tobacco: Never Used    Alcohol use Yes      Comment: 3 glasses of wine per year       Medications/Allergies:  Current Outpatient Prescriptions on File Prior to Visit   Medication Sig Dispense Refill    gabapentin (NEURONTIN) 300 mg capsule TAKE 2 CAPSULES BY MOUTH THREE TIMES DAILY 180 Cap 5    pioglitazone-metFORMIN (ACTOPLUS MET)  mg per tablet TAKE 1 TABLET BY MOUTH TWICE DAILY WITH MEALS 180 Tab 1    losartan (COZAAR) 25 mg tablet TAKE 1 TABLET BY MOUTH DAILY 90 Tab 1    levothyroxine (SYNTHROID) 75 mcg tablet TAKE 1 TABLET BY MOUTH DAILY BEFORE BREAKFAST 90 Tab 2    simvastatin (ZOCOR) 40 mg tablet TAKE 1 TABLET BY MOUTH EVERY EVENING 90 Tab 2    OMEPRAZOLE (PRILOSEC PO) Take  by mouth.  furosemide (LASIX) 20 mg tablet Take 1 Tab by mouth daily as needed. 60 Tab 2    cyanocobalamin (VITAMIN B-12) 2,500 mcg sublingual tablet Take 2,500 mcg by mouth daily.  cinnamon bark (CINNAMON) 500 mg cap Take  by mouth.  albuterol (PROAIR HFA) 90 mcg/actuation inhaler Take 2 Puffs by inhalation every six (6) hours as needed for Wheezing or Shortness of Breath. 1 Inhaler 3    aspirin 81 mg tablet Take  by mouth daily. No current facility-administered medications on file prior to visit.          Allergies   Allergen Reactions    Ace Inhibitors Anaphylaxis    Anectine [Succinylcholine Chloride] Shortness of Breath     With rash    Iodinated Contrast- Oral And Iv Dye Hives    Vicodin [Hydrocodone-Acetaminophen] Itching         Health Maintenance:   Health Maintenance   Topic Date Due    ZOSTER VACCINE AGE 60>  10/12/2016    EYE EXAM RETINAL OR DILATED Q1  03/16/2017    BREAST CANCER SCRN MAMMOGRAM  08/14/2017    MICROALBUMIN Q1  11/03/2017    LIPID PANEL Q1  11/03/2017    HEMOGLOBIN A1C Q6M  02/25/2018    FOOT EXAM Q1  03/31/2018    COLONOSCOPY  08/23/2018    PAP AKA CERVICAL CYTOLOGY  10/05/2019    DTaP/Tdap/Td series (2 - Td) 06/23/2025    Hepatitis C Screening  Completed    Pneumococcal 19-64 Medium Risk  Completed    Influenza Age 5 to Adult  Completed       Objective:  Visit Vitals    /84 (BP 1 Location: Left arm, BP Patient Position: Sitting)    Pulse 61    Temp 97.6 °F (36.4 °C) (Oral)    Resp 18    Ht 5' 4\" (1.626 m)    Wt 169 lb (76.7 kg)    LMP 10/16/2010    SpO2 97%    BMI 29.01 kg/m2          Nurses notes and VS reviewed.       Physical Examination: General appearance - alert, well appearing, and in no distress  Chest - clear to auscultation, no wheezes, rales or rhonchi, symmetric air entry  Heart - normal rate, regular rhythm, normal S1, S2, no murmurs, rubs, clicks or gallops  Abdomen - soft, nontender, nondistended, no masses or organomegaly        Labwork and Ancillary Studies:    CBC w/Diff  Lab Results   Component Value Date/Time    WBC 8.9 11/03/2016 07:12 AM    HGB 11.3 11/03/2016 07:12 AM    PLATELET 570 60/11/9660 07:12 AM         Basic Metabolic Profile  Lab Results   Component Value Date/Time    Sodium 138 11/03/2016 07:12 AM    Potassium 4.5 11/03/2016 07:12 AM    Chloride 98 11/03/2016 07:12 AM    CO2 25 11/03/2016 07:12 AM    Anion gap 15.0 11/03/2016 07:12 AM    Glucose 95 11/03/2016 07:12 AM    BUN 10 11/03/2016 07:12 AM    Creatinine 0.6 11/03/2016 07:12 AM    Calcium 9.6 11/03/2016 07:12 AM LFT  Lab Results   Component Value Date/Time    ALT (SGPT) 15 11/03/2016 07:12 AM    AST (SGOT) 19 11/03/2016 07:12 AM    Alk.  phosphatase 77 11/03/2016 07:12 AM    Bilirubin, direct <0.2 11/03/2016 07:12 AM    Bilirubin, total 0.3 11/03/2016 07:12 AM         Cholesterol  Lab Results   Component Value Date/Time    Cholesterol, total 174 11/03/2016 07:12 AM    HDL Cholesterol 65 11/03/2016 07:12 AM    LDL, calculated 82 11/03/2016 07:12 AM    Triglyceride 137 11/03/2016 07:12 AM

## 2018-02-08 DIAGNOSIS — I10 ESSENTIAL HYPERTENSION: ICD-10-CM

## 2018-02-08 DIAGNOSIS — E11.49 OTHER DIABETIC NEUROLOGICAL COMPLICATION ASSOCIATED WITH TYPE 2 DIABETES MELLITUS (HCC): ICD-10-CM

## 2018-02-08 DIAGNOSIS — E03.4 HYPOTHYROIDISM DUE TO ACQUIRED ATROPHY OF THYROID: ICD-10-CM

## 2018-02-08 RX ORDER — LOSARTAN POTASSIUM 25 MG/1
TABLET ORAL
Qty: 90 TAB | Refills: 0 | Status: SHIPPED | OUTPATIENT
Start: 2018-02-08 | End: 2018-05-14 | Stop reason: SDUPTHER

## 2018-02-08 RX ORDER — PIOGLITAZONE HCL AND METFORMIN HCL 500; 15 MG/1; MG/1
TABLET ORAL
Qty: 180 TAB | Refills: 0 | Status: SHIPPED | OUTPATIENT
Start: 2018-02-08 | End: 2018-05-14 | Stop reason: SDUPTHER

## 2018-02-08 RX ORDER — LEVOTHYROXINE SODIUM 75 UG/1
TABLET ORAL
Qty: 90 TAB | Refills: 0 | Status: SHIPPED | OUTPATIENT
Start: 2018-02-08 | End: 2018-05-14 | Stop reason: SDUPTHER

## 2018-03-16 ENCOUNTER — OFFICE VISIT (OUTPATIENT)
Dept: FAMILY MEDICINE CLINIC | Age: 63
End: 2018-03-16

## 2018-03-16 VITALS
BODY MASS INDEX: 29.26 KG/M2 | TEMPERATURE: 97.6 F | OXYGEN SATURATION: 96 % | SYSTOLIC BLOOD PRESSURE: 130 MMHG | HEART RATE: 69 BPM | DIASTOLIC BLOOD PRESSURE: 70 MMHG | RESPIRATION RATE: 20 BRPM | HEIGHT: 64 IN | WEIGHT: 171.4 LBS

## 2018-03-16 DIAGNOSIS — E03.4 HYPOTHYROIDISM DUE TO ACQUIRED ATROPHY OF THYROID: ICD-10-CM

## 2018-03-16 DIAGNOSIS — E66.3 OVERWEIGHT (BMI 25.0-29.9): ICD-10-CM

## 2018-03-16 DIAGNOSIS — I10 ESSENTIAL HYPERTENSION: ICD-10-CM

## 2018-03-16 DIAGNOSIS — Z12.39 BREAST CANCER SCREENING: ICD-10-CM

## 2018-03-16 DIAGNOSIS — E78.00 PURE HYPERCHOLESTEROLEMIA: ICD-10-CM

## 2018-03-16 DIAGNOSIS — D64.9 ANEMIA, UNSPECIFIED TYPE: ICD-10-CM

## 2018-03-16 DIAGNOSIS — Z78.0 POSTMENOPAUSAL: ICD-10-CM

## 2018-03-16 DIAGNOSIS — E11.49 OTHER DIABETIC NEUROLOGICAL COMPLICATION ASSOCIATED WITH TYPE 2 DIABETES MELLITUS (HCC): Primary | ICD-10-CM

## 2018-03-16 NOTE — PROGRESS NOTES
Assessment/Plan:    *Diagnoses and all orders for this visit:    1. Other diabetic neurological complication associated with type 2 diabetes mellitus ([facility] [address]  -     HEMOGLOBIN A1C W/O EAG; Future    2. Essential hypertension    3. Pure hypercholesterolemia    4. Hypothyroidism due to acquired atrophy of thyroid    5. Postmenopausal  -     DEXA BONE DENSITY STUDY AXIAL; Future    6. Breast cancer screening  -     [facility] MAMMO BI SCREENING INCL CAD; Future    7. Anemia, unspecified type  -     CBC WITH AUTOMATED DIFF; Future  -     IRON PROFILE; Future  -     FERRITIN; Future    8. Overweight (BMI 25.0-29. 9)    Other orders  -     varicella-zoster recombinant, PF, (SHINGRIX, PF,) 50 mcg/0.5 mL susr injection; 0.5 mL by IntraMUSCular route once for 1 dose. F/u 4 months. The plan was discussed with the patient. The patient verbalized understanding and is in agreement with the plan. All medication potential side effects were discussed with the patient.    -------------------------------------------------------------------------------------------------------------------        Yaritza Henriquez is a 65 y.o. female and presents with Diabetes and Hypertension         Subjective:  Pt here for f/u. DM: will repeat levels. Has been monitoring diet. Needs to exercise. HTN: well controlled. HLD: stable. Anemia: on last CBC. Never returned to do iron studies. Will do them today. Hypothyroidism: asymptomatic. ROS:  Constitutional: No recent weight change. No weakness/fatigue. No f/c. Skin: No rashes, change in nails/hair, itching   HENT: No HA, dizziness. No hearing loss/tinnitus. No nasal congestion/discharge. Eyes: No change in vision, double/blurred vision or eye pain/redness. Cardiovascular: No CP/palpitations. No LIANG/orthopnea/PND. Respiratory: No cough/sputum, dyspnea, wheezing. Gastointestinal: No dysphagia, reflux. No n/v. No constipation/diarrhea.   No melena/rectal bleeding. Genitourinary: No dysuria, urinary hesitancy, nocturia, hematuria. No incontinence. Musculoskeletal: No joint pain/stiffness. No muscle pain/tenderness. Endo: No heat/cold intolerance, no polyuria/polydypsia. Heme: No h/o anemia. No easy bleeding/bruising. Allergy/Immunology: No seasonal rhinitis. Denies frequent colds, sinus/ear infections. Neurological: No seizures/numbness/weakness. No paresthesias. Psychiatric:  No depression, anxiety. The problem list was updated as a part of today's visit. Patient Active Problem List   Diagnosis Code    HTN (hypertension) I10    Hyperlipidemia E78.5    Goiter E04.9    Sleep apnea G47.30    GERD (gastroesophageal reflux disease) K21.9    Macular degeneration H35.30    Fatty liver K76.0    Allergic rhinitis J30.9    Family history of colon cancer Z80.0    Peripheral neuropathy G62.9    Hypothyroidism E03.9    Microalbuminuria due to type 2 diabetes mellitus ([facility] [address] E11.29, R80.9    Diabetic neuropathy (HCC) E11.40       The PSH, FH were reviewed. SH:  Social History   Substance Use Topics    Smoking status: Former Smoker     Packs/day: 0.10     Years: 0.50     Types: Cigarettes     Quit date: 1/1/1975    Smokeless tobacco: Never Used    Alcohol use Yes      Comment: 3 glasses of wine per year       Medications/Allergies:  Current Outpatient Prescriptions on File Prior to Visit   Medication Sig Dispense Refill    gabapentin (NEURONTIN) 300 mg capsule TAKE 2 CAPSULES BY MOUTH THREE TIMES DAILY 180 Cap 0    levothyroxine (SYNTHROID) 75 mcg tablet TAKE 1 TABLET BY MOUTH DAILY BEFORE BREAKFAST 90 Tab 0    pioglitazone-metFORMIN (ACTOPLUS MET)  mg per tablet TAKE 1 TABLET BY MOUTH TWICE DAILY WITH MEALS 180 Tab 1    losartan (COZAAR) 25 mg tablet TAKE 1 TABLET BY MOUTH DAILY 90 Tab 1    simvastatin (ZOCOR) 40 mg tablet TAKE 1 TABLET BY MOUTH EVERY EVENING 90 Tab 2    OMEPRAZOLE (PRILOSEC PO) Take  by mouth.       furosemide (LASIX) 20 mg tablet Take 1 Tab by mouth daily as needed. 60 Tab 2    aspirin 81 mg tablet Take  by mouth daily.  losartan (COZAAR) 25 mg tablet TAKE 1 TABLET BY MOUTH DAILY 90 Tab 0    pioglitazone-metFORMIN (ACTOPLUS MET)  mg per tablet TAKE 1 TABLET BY MOUTH TWICE DAILY WITH MEALS 180 Tab 0    cyanocobalamin (VITAMIN B-12) 2,500 mcg sublingual tablet Take 2,500 mcg by mouth daily.  cinnamon bark (CINNAMON) 500 mg cap Take  by mouth.  albuterol (PROAIR HFA) 90 mcg/actuation inhaler Take 2 Puffs by inhalation every six (6) hours as needed for Wheezing or Shortness of Breath. 1 Inhaler 3     No current facility-administered medications on file prior to visit. Allergies   Allergen Reactions    Ace Inhibitors Anaphylaxis    Anectine [Succinylcholine Chloride] Shortness of Breath     With rash    Iodinated Contrast- Oral And Iv Dye Hives    Vicodin [Hydrocodone-Acetaminophen] Itching         Health Maintenance:   Health Maintenance   Topic Date Due    ZOSTER VACCINE AGE 60>  10/12/2016    EYE EXAM RETINAL OR DILATED Q1  03/16/2017    BREAST CANCER SCRN MAMMOGRAM  08/14/2017    MICROALBUMIN Q1  11/03/2017    FOOT EXAM Q1  03/31/2018    COLONOSCOPY  08/23/2018    HEMOGLOBIN A1C Q6M  05/10/2018    LIPID PANEL Q1  11/10/2018    PAP AKA CERVICAL CYTOLOGY  10/05/2019    DTaP/Tdap/Td series (2 - Td) 06/23/2025    Hepatitis C Screening  Completed    Pneumococcal 19-64 Medium Risk  Completed    Influenza Age 5 to Adult  Completed       Objective:  Visit Vitals    /70 (BP 1 Location: Left arm, BP Patient Position: Sitting)    Pulse 69    Temp 97.6 °F (36.4 °C) (Oral)    Resp 20    Ht 5' 4\" (1.626 m)    Wt 171 lb 6.4 oz (77.7 kg)    LMP 10/16/2010    SpO2 96%    BMI 29.42 kg/m2          Nurses notes and VS reviewed.       Physical Examination: General appearance - alert, well appearing, and in no distress  Chest - clear to auscultation, no wheezes, rales or rhonchi, symmetric air entry  Heart - normal rate, regular rhythm, normal S1, S2, no murmurs, rubs, clicks or gallops        Labwork and Ancillary Studies:    CBC w/Diff  Lab Results   Component Value Date/Time    WBC 8.9 11/03/2016 07:12 AM    HGB 11.3 (L) 11/03/2016 07:12 AM    PLATELET 526 53/95/2838 07:12 AM         Basic Metabolic Profile  Lab Results   Component Value Date/Time    Sodium 138 11/03/2016 07:12 AM    Potassium 4.5 11/03/2016 07:12 AM    Chloride 98 11/03/2016 07:12 AM    CO2 25 11/03/2016 07:12 AM    Anion gap 15.0 11/03/2016 07:12 AM    Glucose 95 11/03/2016 07:12 AM    BUN 10 11/03/2016 07:12 AM    Creatinine 0.6 11/03/2016 07:12 AM    Calcium 9.6 11/03/2016 07:12 AM         LFT  Lab Results   Component Value Date/Time    ALT (SGPT) 15 11/03/2016 07:12 AM    AST (SGOT) 19 11/03/2016 07:12 AM    Alk.  phosphatase 77 11/03/2016 07:12 AM    Bilirubin, direct <0.2 11/03/2016 07:12 AM    Bilirubin, total 0.3 11/03/2016 07:12 AM         Cholesterol  Lab Results   Component Value Date/Time    Cholesterol, total 174 11/03/2016 07:12 AM    HDL Cholesterol 65 (H) 11/03/2016 07:12 AM    LDL, calculated 82 11/03/2016 07:12 AM    Triglyceride 137 11/03/2016 07:12 AM

## 2018-03-16 NOTE — PROGRESS NOTES
Mayi Meeks is a 65 y.o. female is here for blood pressure and diabetes. 1. Have you been to the ER, urgent care clinic since your last visit? Hospitalized since your last visit? No    2. Have you seen or consulted any other health care providers outside of the [address] [place] [address] since your last visit? Include any pap smears or colon screening.  No     Health Maintenance Due   Topic Date Due    ZOSTER VACCINE AGE 60>  10/12/2016    EYE EXAM RETINAL OR DILATED Q1  03/16/2017    BREAST CANCER SCRN MAMMOGRAM  08/14/2017    MICROALBUMIN Q1  11/03/2017    FOOT EXAM Q1  03/31/2018    COLONOSCOPY  08/23/2018

## 2018-03-16 NOTE — MR AVS SNAPSHOT
[address]  [address] [facility] [address] [phone] Patient: Bello Hogan MRN: [id number] :1955 Visit Information Date & Time Provider Department Dept. Phone Encounter #  
 3/16/2018  9:45 AM Roma Verma [place] [phone] [id number] Upcoming Health Maintenance Date Due ZOSTER VACCINE AGE 60> 10/12/2016 EYE EXAM RETINAL OR DILATED Q1 3/16/2017 BREAST CANCER SCRN MAMMOGRAM 2017 MICROALBUMIN Q1 11/3/2017 FOOT EXAM Q1 3/31/2018 COLONOSCOPY 2018 HEMOGLOBIN A1C Q6M 5/10/2018 LIPID PANEL Q1 11/10/2018 PAP AKA CERVICAL CYTOLOGY 10/5/2019 DTaP/Tdap/Td series (2 - Td) 2025 Allergies as of 3/16/2018  Review Complete On: 3/16/2018 By: Cornell Tucker LPN Severity Noted Reaction Type Reactions Ace Inhibitors High 10/20/2010   Systemic Anaphylaxis Anectine [Succinylcholine Chloride] High 10/20/2010    Shortness of Breath With rash Iodinated Contrast- Oral And Iv Dye High 10/20/2010   Systemic Hives Vicodin [Hydrocodone-acetaminophen]  10/14/2014    Itching Current Immunizations  Reviewed on 2017 Name Date Influenza Vaccine 10/29/2015 Influenza Vaccine (Quad) PF 2017  3:57 PM, 11/15/2016  3:57 PM  
 Influenza Vaccine Split 2012 Pneumococcal Polysaccharide (PPSV-23) 11/15/2016  3:56 PM  
 Tdap 2015  3:43 PM  
  
 Not reviewed this visit You Were Diagnosed With   
  
 Codes Comments Other diabetic neurological complication associated with type 2 diabetes mellitus ([facility]    -  Primary ICD-10-CM: E11.49 
ICD-9-CM: 250.60 Essential hypertension     ICD-10-CM: I10 
ICD-9-CM: 401.9 Pure hypercholesterolemia     ICD-10-CM: E78.00 ICD-9-CM: 272.0 Hypothyroidism due to acquired atrophy of thyroid     ICD-10-CM: E03.4 ICD-9-CM: 244.8, 246.8 Postmenopausal     ICD-10-CM: Z78.0 ICD-9-CM: V49.81 Breast cancer screening     ICD-10-CM: Z12.31 
ICD-9-CM: V76.10 Anemia, unspecified type     ICD-10-CM: D64.9 ICD-9-CM: 442. 9 Vitals BP Pulse Temp Resp Height(growth percentile) Weight(growth percentile) 152/88 (BP 1 Location: Left arm, BP Patient Position: Sitting) 69 97.6 °F (36.4 °C) (Oral) 20 5' 4\" (1.626 m) 171 lb 6.4 oz (77.7 kg) LMP SpO2 BMI OB Status Smoking Status 10/16/2010 96% 29.42 kg/m2 Hysterectomy Former Smoker BMI and BSA Data Body Mass Index Body Surface Area  
 29.42 kg/m 2 1.87 m 2 Preferred Pharmacy Pharmacy Name Phone [address] [facility] [address]  [facility] [phone] Your Updated Medication List  
  
   
This list is accurate as of 3/16/18 10:39 AM.  Always use your most recent med list.  
  
  
  
  
 albuterol 90 mcg/actuation inhaler Commonly known as:  PROAIR HFA Take 2 Puffs by inhalation every six (6) hours as needed for Wheezing or Shortness of Breath. aspirin 81 mg tablet Take  by mouth daily. CINNAMON 500 mg Cap Generic drug:  cinnamon bark Take  by mouth. furosemide 20 mg tablet Commonly known as:  LASIX Take 1 Tab by mouth daily as needed. gabapentin 300 mg capsule Commonly known as:  NEURONTIN  
TAKE 2 CAPSULES BY MOUTH THREE TIMES DAILY  
  
 levothyroxine 75 mcg tablet Commonly known as:  SYNTHROID  
TAKE 1 TABLET BY MOUTH DAILY BEFORE BREAKFAST * losartan 25 mg tablet Commonly known as:  COZAAR  
TAKE 1 TABLET BY MOUTH DAILY  
  
 * losartan 25 mg tablet Commonly known as:  COZAAR  
TAKE 1 TABLET BY MOUTH DAILY * pioglitazone-metFORMIN  mg per tablet Commonly known as:  ACTOPLUS MET  
TAKE 1 TABLET BY MOUTH TWICE DAILY WITH MEALS * pioglitazone-metFORMIN  mg per tablet Commonly known as:  ACTOPLUS MET  
 TAKE 1 TABLET BY MOUTH TWICE DAILY WITH MEALS  
  
 PRILOSEC PO Take  by mouth. simvastatin 40 mg tablet Commonly known as:  ZOCOR  
TAKE 1 TABLET BY MOUTH EVERY EVENING  
  
 varicella-zoster recombinant (PF) 50 mcg/0.5 mL Susr injection Commonly known as:  SHINGRIX (PF)  
0.5 mL by IntraMUSCular route once for 1 dose. VITAMIN B-12 2,500 mcg sublingual tablet Generic drug:  cyanocobalamin Take 2,500 mcg by mouth daily. * Notice: This list has 4 medication(s) that are the same as other medications prescribed for you. Read the directions carefully, and ask your doctor or other care provider to review them with you. Prescriptions Printed Refills  
 varicella-zoster recombinant, PF, (SHINGRIX, PF,) 50 mcg/0.5 mL susr injection 0 Si.5 mL by IntraMUSCular route once for 1 dose. Class: Print Route: IntraMUSCular To-Do List   
 2018 Lab:  CBC WITH AUTOMATED DIFF   
  
 2018 Imaging:  DEXA BONE DENSITY STUDY AXIAL   
  
 2018 Lab:  FERRITIN   
  
 2018 Lab:  HEMOGLOBIN A1C W/O EAG   
  
 2018 Lab:  IRON PROFILE   
  
 2018 Imaging:  [facility] MAMMO BI SCREENING INCL CAD Patient Instructions Anemia: Care Instructions Your Care Instructions Anemia is a low level of red blood cells, which carry oxygen throughout your body. Many things can cause anemia. Lack of iron is one of the most common causes. Your body needs iron to make hemoglobin, a substance in red blood cells that carries oxygen from the lungs to your body's cells. Without enough iron, the body produces fewer and smaller red blood cells. As a result, your body's cells do not get enough oxygen, and you feel tired and weak. And you may have trouble concentrating. Bleeding is the most common cause of a lack of iron.  You may have heavy menstrual bleeding or bleeding caused by conditions such as ulcers, hemorrhoids, or cancer. Regular use of aspirin or other anti-inflammatory medicines (such as ibuprofen) also can cause bleeding in some people. A lack of iron in your diet also can cause anemia, especially at times when the body needs more iron, such as during pregnancy, infancy, and the teen years. Your doctor may have prescribed iron pills. It may take several months of treatment for your iron levels to return to normal. Your doctor also may suggest that you eat foods that are rich in iron, such as meat and beans. There are many other causes of anemia. It is not always due to a lack of iron. Finding the specific cause of your anemia will help your doctor find the right treatment for you. Follow-up care is a key part of your treatment and safety. Be sure to make and go to all appointments, and call your doctor if you are having problems. It's also a good idea to know your test results and keep a list of the medicines you take. How can you care for yourself at home? · Take your medicines exactly as prescribed. Call your doctor if you think you are having a problem with your medicine. · If your doctor recommends iron pills, take them as directed: ¨ Try to take the pills on an empty stomach about 1 hour before or 2 hours after meals. But you may need to take iron with food to avoid an upset stomach. ¨ Do not take antacids or drink milk or caffeine drinks (such as coffee, tea, or cola) at the same time or within 2 hours of the time that you take your iron. They can make it hard for your body to absorb the iron. ¨ Vitamin C (from food or supplements) helps your body absorb iron. Try taking iron pills with a glass of orange juice or some other food that is high in vitamin C, such as citrus fruits. ¨ Iron pills may cause stomach problems, such as heartburn, nausea, diarrhea, constipation, and cramps. Be sure to drink plenty of fluids, and include fruits, vegetables, and fiber in your diet each day.  Iron pills often make your bowel movements dark or green. ¨ If you forget to take an iron pill, do not take a double dose of iron the next time you take a pill. ¨ Keep iron pills out of the reach of small children. An overdose of iron can be very dangerous. · Follow your doctor's advice about eating iron-rich foods. These include red meat, shellfish, poultry, eggs, beans, raisins, whole-grain bread, and leafy green vegetables. · Steam vegetables to help them keep their iron content. When should you call for help? Call 911 anytime you think you may need emergency care. For example, call if: 
? · You have symptoms of a heart attack. These may include: ¨ Chest pain or pressure, or a strange feeling in the chest. 
¨ Sweating. ¨ Shortness of breath. ¨ Nausea or vomiting. ¨ Pain, pressure, or a strange feeling in the back, neck, jaw, or upper belly or in one or both shoulders or arms. ¨ Lightheadedness or sudden weakness. ¨ A fast or irregular heartbeat. After you call 911, the  may tell you to chew 1 adult-strength or 2 to 4 low-dose aspirin. Wait for an ambulance. Do not try to drive yourself. ? · You passed out (lost consciousness). ?Call your doctor now or seek immediate medical care if: 
? · You have new or increased shortness of breath. ? · You are dizzy or lightheaded, or you feel like you may faint. ? · Your fatigue and weakness continue or get worse. ? · You have any abnormal bleeding, such as: 
¨ Nosebleeds. ¨ Vaginal bleeding that is different (heavier, more frequent, at a different time of the month) than what you are used to. ¨ Bloody or black stools, or rectal bleeding. ¨ Bloody or pink urine. ? Watch closely for changes in your health, and be sure to contact your doctor if: 
? · You do not get better as expected. Where can you learn more? Go to http://junito-grace.info/. Enter R301 in the search box to learn more about \"Anemia: Care Instructions. \" 
 Current as of: October 13, 2016 Content Version: 11.4 © 6342-5603 Healthwise, [employer]. Care instructions adapted under license by [employer] (which disclaims liability or warranty for this information). If you have questions about a medical condition or this instruction, always ask your healthcare professional. [address] any warranty or liability for your use of this information. Introducing \[facility]\"" & HEALTH SERVICES! [facility] introduces i[employer]ando patient portal. Now you can access parts of your medical record, email your doctor's office, and request medication refills online. 1. In your internet browser, go to [email or web address] 2. Click on the First Time User? Click Here link in the Sign In box. You will see the New Member Sign Up page. 3. Enter your [employer] Access Code exactly as it appears below. You will not need to use this code after youve completed the sign-up process. If you do not sign up before the expiration date, you must request a new code. · [employer] Access Code: [email or web address] Expires: 6/14/2018 10:39 AM 
 
4. Enter the last four digits of your Social Security Number (xxxx) and Date of Birth (mm/dd/yyyy) as indicated and click Submit. You will be taken to the next sign-up page. 5. Create a iOmando ID. This will be your [employer] login ID and cannot be changed, so think of one that is secure and easy to remember. 6. Create a [employer] password. You can change your password at any time. 7. Enter your Password Reset Question and Answer. This can be used at a later time if you forget your password. 8. Enter your e-mail address. You will receive e-mail notification when new information is available in [address] 9. Click Sign Up. You can now view and download portions of your medical record. 10. Click the Download Summary menu link to download a portable copy of your medical information. If you have questions, please visit the Frequently Asked Questions section of the [employer] website. Remember, [employer] is NOT to be used for urgent needs. For medical emergencies, dial 911. Now available from your iPhone and Android! Please provide this summary of care documentation to your next provider. Your primary care clinician is listed as Steph Olson. If you have any questions after today's visit, please call [phone].

## 2018-03-16 NOTE — PATIENT INSTRUCTIONS
Anemia: Care Instructions  Your Care Instructions    Anemia is a low level of red blood cells, which carry oxygen throughout your body. Many things can cause anemia. Lack of iron is one of the most common causes. Your body needs iron to make hemoglobin, a substance in red blood cells that carries oxygen from the lungs to your body's cells. Without enough iron, the body produces fewer and smaller red blood cells. As a result, your body's cells do not get enough oxygen, and you feel tired and weak. And you may have trouble concentrating. Bleeding is the most common cause of a lack of iron. You may have heavy menstrual bleeding or bleeding caused by conditions such as ulcers, hemorrhoids, or cancer. Regular use of aspirin or other anti-inflammatory medicines (such as ibuprofen) also can cause bleeding in some people. A lack of iron in your diet also can cause anemia, especially at times when the body needs more iron, such as during pregnancy, infancy, and the teen years. Your doctor may have prescribed iron pills. It may take several months of treatment for your iron levels to return to normal. Your doctor also may suggest that you eat foods that are rich in iron, such as meat and beans. There are many other causes of anemia. It is not always due to a lack of iron. Finding the specific cause of your anemia will help your doctor find the right treatment for you. Follow-up care is a key part of your treatment and safety. Be sure to make and go to all appointments, and call your doctor if you are having problems. It's also a good idea to know your test results and keep a list of the medicines you take. How can you care for yourself at home? · Take your medicines exactly as prescribed. Call your doctor if you think you are having a problem with your medicine. · If your doctor recommends iron pills, take them as directed:  ¨ Try to take the pills on an empty stomach about 1 hour before or 2 hours after meals. But you may need to take iron with food to avoid an upset stomach. ¨ Do not take antacids or drink milk or caffeine drinks (such as coffee, tea, or cola) at the same time or within 2 hours of the time that you take your iron. They can make it hard for your body to absorb the iron. ¨ Vitamin C (from food or supplements) helps your body absorb iron. Try taking iron pills with a glass of orange juice or some other food that is high in vitamin C, such as citrus fruits. ¨ Iron pills may cause stomach problems, such as heartburn, nausea, diarrhea, constipation, and cramps. Be sure to drink plenty of fluids, and include fruits, vegetables, and fiber in your diet each day. Iron pills often make your bowel movements dark or green. ¨ If you forget to take an iron pill, do not take a double dose of iron the next time you take a pill. ¨ Keep iron pills out of the reach of small children. An overdose of iron can be very dangerous. · Follow your doctor's advice about eating iron-rich foods. These include red meat, shellfish, poultry, eggs, beans, raisins, whole-grain bread, and leafy green vegetables. · Steam vegetables to help them keep their iron content. When should you call for help? Call 911 anytime you think you may need emergency care. For example, call if:  ? · You have symptoms of a heart attack. These may include:  ¨ Chest pain or pressure, or a strange feeling in the chest.  ¨ Sweating. ¨ Shortness of breath. ¨ Nausea or vomiting. ¨ Pain, pressure, or a strange feeling in the back, neck, jaw, or upper belly or in one or both shoulders or arms. ¨ Lightheadedness or sudden weakness. ¨ A fast or irregular heartbeat. After you call 911, the  may tell you to chew 1 adult-strength or 2 to 4 low-dose aspirin. Wait for an ambulance. Do not try to drive yourself. ? · You passed out (lost consciousness).    ?Call your doctor now or seek immediate medical care if:  ? · You have new or increased shortness of breath. ? · You are dizzy or lightheaded, or you feel like you may faint. ? · Your fatigue and weakness continue or get worse. ? · You have any abnormal bleeding, such as:  ¨ Nosebleeds. ¨ Vaginal bleeding that is different (heavier, more frequent, at a different time of the month) than what you are used to. ¨ Bloody or black stools, or rectal bleeding. ¨ Bloody or pink urine. ? Watch closely for changes in your health, and be sure to contact your doctor if:  ? · You do not get better as expected. Where can you learn more? Go to http://junito-grace.info/. Enter R301 in the search box to learn more about \"Anemia: Care Instructions. \"  Current as of: October 13, 2016  Content Version: 11.4  © 9324-2694 [employer]. Care instructions adapted under license by [employer] (which disclaims liability or warranty for this information). If you have questions about a medical condition or this instruction, always ask your healthcare professional. [place] [address] any warranty or liability for your use of this information.

## 2018-03-17 LAB
ABSOLUTE LYMPHOCYTE COUNT, 10803: 3 K/UL (ref 1–4.8)
AVG GLU, 10930: 136 MG/DL (ref 91–123)
BASOPHILS # BLD: 0 K/UL (ref 0–0.2)
BASOPHILS NFR BLD: 0 % (ref 0–2)
EOSINOPHIL # BLD: 0.2 K/UL (ref 0–0.5)
EOSINOPHIL NFR BLD: 3 % (ref 0–6)
ERYTHROCYTE [DISTWIDTH] IN BLOOD BY AUTOMATED COUNT: 14.3 % (ref 10–15.5)
FE % SATURATION,PSAT: 20 % (ref 20–50)
FERRITIN SERPL-MCNC: 29 NG/ML (ref 10–291)
GRANULOCYTES,GRANS: 49 % (ref 40–75)
HBA1C MFR BLD HPLC: 6.4 % (ref 4.8–5.9)
HCT VFR BLD AUTO: 35.3 % (ref 35.1–48)
HGB BLD-MCNC: 11.1 G/DL (ref 11.7–16)
IRON,IRN: 65 MCG/DL (ref 30–160)
LYMPHOCYTES, LYMLT: 40 % (ref 20–45)
MCH RBC QN AUTO: 29 PG (ref 26–34)
MCHC RBC AUTO-ENTMCNC: 31 G/DL (ref 31–36)
MCV RBC AUTO: 93 FL (ref 80–95)
MONOCYTES # BLD: 0.6 K/UL (ref 0.1–1)
MONOCYTES NFR BLD: 8 % (ref 3–12)
NEUTROPHILS # BLD AUTO: 3.7 K/UL (ref 1.8–7.7)
PLATELET # BLD AUTO: 218 K/UL (ref 140–440)
PMV BLD AUTO: 11.5 FL (ref 9–13)
RBC # BLD AUTO: 3.78 M/UL (ref 3.8–5.2)
TIBC,TIBC: 326 MCG/DL (ref 228–428)
UIBC SERPL-MCNC: 261 MCG/DL (ref 110–370)
WBC # BLD AUTO: 7.5 K/UL (ref 4–11)

## 2018-04-02 RX ORDER — GABAPENTIN 300 MG/1
CAPSULE ORAL
Qty: 180 CAP | Refills: 0 | Status: SHIPPED | OUTPATIENT
Start: 2018-04-02 | End: 2018-04-29 | Stop reason: SDUPTHER

## 2018-05-14 DIAGNOSIS — I10 ESSENTIAL HYPERTENSION: ICD-10-CM

## 2018-05-14 DIAGNOSIS — E11.49 OTHER DIABETIC NEUROLOGICAL COMPLICATION ASSOCIATED WITH TYPE 2 DIABETES MELLITUS (HCC): ICD-10-CM

## 2018-05-14 DIAGNOSIS — E03.4 HYPOTHYROIDISM DUE TO ACQUIRED ATROPHY OF THYROID: ICD-10-CM

## 2018-05-15 RX ORDER — PIOGLITAZONE HCL AND METFORMIN HCL 500; 15 MG/1; MG/1
TABLET ORAL
Qty: 180 TAB | Refills: 0 | Status: SHIPPED | OUTPATIENT
Start: 2018-05-15 | End: 2018-08-25 | Stop reason: SDUPTHER

## 2018-05-15 RX ORDER — LOSARTAN POTASSIUM 25 MG/1
TABLET ORAL
Qty: 90 TAB | Refills: 0 | Status: SHIPPED | OUTPATIENT
Start: 2018-05-15 | End: 2018-08-25 | Stop reason: SDUPTHER

## 2018-05-15 RX ORDER — LEVOTHYROXINE SODIUM 75 UG/1
TABLET ORAL
Qty: 90 TAB | Refills: 0 | Status: SHIPPED | OUTPATIENT
Start: 2018-05-15 | End: 2018-08-25 | Stop reason: SDUPTHER

## 2018-08-10 ENCOUNTER — OFFICE VISIT (OUTPATIENT)
Dept: FAMILY MEDICINE CLINIC | Age: 63
End: 2018-08-10

## 2018-08-10 VITALS
BODY MASS INDEX: 30.22 KG/M2 | HEIGHT: 64 IN | SYSTOLIC BLOOD PRESSURE: 140 MMHG | OXYGEN SATURATION: 98 % | HEART RATE: 64 BPM | RESPIRATION RATE: 18 BRPM | TEMPERATURE: 98 F | WEIGHT: 177 LBS | DIASTOLIC BLOOD PRESSURE: 80 MMHG

## 2018-08-10 DIAGNOSIS — R80.9 MICROALBUMINURIA DUE TO TYPE 2 DIABETES MELLITUS (HCC): ICD-10-CM

## 2018-08-10 DIAGNOSIS — R80.9 MICROALBUMINURIA DUE TO TYPE 2 DIABETES MELLITUS (HCC): Primary | ICD-10-CM

## 2018-08-10 DIAGNOSIS — Z00.00 ANNUAL PHYSICAL EXAM: ICD-10-CM

## 2018-08-10 DIAGNOSIS — E03.4 HYPOTHYROIDISM DUE TO ACQUIRED ATROPHY OF THYROID: ICD-10-CM

## 2018-08-10 DIAGNOSIS — M79.672 LEFT FOOT PAIN: ICD-10-CM

## 2018-08-10 DIAGNOSIS — E11.29 MICROALBUMINURIA DUE TO TYPE 2 DIABETES MELLITUS (HCC): Primary | ICD-10-CM

## 2018-08-10 DIAGNOSIS — E78.00 PURE HYPERCHOLESTEROLEMIA: ICD-10-CM

## 2018-08-10 DIAGNOSIS — E11.29 MICROALBUMINURIA DUE TO TYPE 2 DIABETES MELLITUS (HCC): ICD-10-CM

## 2018-08-10 DIAGNOSIS — I10 ESSENTIAL HYPERTENSION: ICD-10-CM

## 2018-08-10 PROBLEM — E11.3599 TYPE 2 DIABETES MELLITUS WITH PROLIFERATIVE RETINOPATHY (HCC): Status: ACTIVE | Noted: 2018-08-10

## 2018-08-10 LAB
ALBUMIN UR QL STRIP: 10 MG/L
CREATININE, URINE POC: 200 MG/DL
HBA1C MFR BLD HPLC: 5.8 %
MICROALBUMIN/CREAT RATIO POC: <30 MG/G

## 2018-08-10 NOTE — PATIENT INSTRUCTIONS

## 2018-08-10 NOTE — PROGRESS NOTES
Assessment/Plan:    *Diagnoses and all orders for this visit:    1. Microalbuminuria due to type 2 diabetes mellitus (HCC)  -     AMB POC URINE, MICROALBUMIN, SEMIQUANT (3 RESULTS)  -     AMB POC HEMOGLOBIN A1C    2. Essential hypertension    3. Pure hypercholesterolemia    4. Hypothyroidism due to acquired atrophy of thyroid    5. Left foot pain  -     REFERRAL TO PODIATRY        Physical in 3 months. EVAN    Has appt to get her colonoscopy done in Sept.    The plan was discussed with the patient. The patient verbalized understanding and is in agreement with the plan. All medication potential side effects were discussed with the patient.    -------------------------------------------------------------------------------------------------------------------        Chitra Castro is a 65 y.o. female and presents with Hypertension; Diabetes; and Cholesterol Problem         Subjective:  Pt here for f/u. DM: A1c today is 5.8%. HTN: borderline. Hypothyroidism: asymptomatic. HLD: stable. ROS:  Constitutional: No recent weight change. No weakness/fatigue. No f/c. Skin: No rashes, change in nails/hair, itching   HENT: No HA, dizziness. No hearing loss/tinnitus. No nasal congestion/discharge. Eyes: No change in vision, double/blurred vision or eye pain/redness. Cardiovascular: No CP/palpitations. No LIANG/orthopnea/PND. Respiratory: No cough/sputum, dyspnea, wheezing. Gastointestinal: No dysphagia, reflux. No n/v. No constipation/diarrhea. No melena/rectal bleeding. Genitourinary: No dysuria, urinary hesitancy, nocturia, hematuria. No incontinence. Musculoskeletal: No joint pain/stiffness. No muscle pain/tenderness. Endo: No heat/cold intolerance, no polyuria/polydypsia. Heme: No h/o anemia. No easy bleeding/bruising. Allergy/Immunology: No seasonal rhinitis. Denies frequent colds, sinus/ear infections. Neurological: No seizures/numbness/weakness. No paresthesias. Psychiatric:  No depression, anxiety. The problem list was updated as a part of today's visit. Patient Active Problem List   Diagnosis Code    HTN (hypertension) I10    Hyperlipidemia E78.5    Goiter E04.9    Sleep apnea G47.30    GERD (gastroesophageal reflux disease) K21.9    Macular degeneration H35.30    Fatty liver K76.0    Allergic rhinitis J30.9    Family history of colon cancer Z80.0    Peripheral neuropathy G62.9    Hypothyroidism E03.9    Microalbuminuria due to type 2 diabetes mellitus ([facility] E11.29, R80.9    Diabetic neuropathy ([facility] E11.40    Type 2 diabetes mellitus with proliferative retinopathy ([facility] E17.6003       The PSH, FH were reviewed. SH:  Social History   Substance Use Topics    Smoking status: Former Smoker     Packs/day: 0.10     Years: 0.50     Types: Cigarettes     Quit date: 1/1/1975    Smokeless tobacco: Never Used    Alcohol use Yes      Comment: 3 glasses of wine per year       Medications/Allergies:  Current Outpatient Prescriptions on File Prior to Visit   Medication Sig Dispense Refill    gabapentin (NEURONTIN) 300 mg capsule TAKE 2 CAPSULES BY MOUTH THREE TIMES DAILY 180 Cap 4    pioglitazone-metFORMIN (ACTOPLUS MET)  mg per tablet TAKE 1 TABLET BY MOUTH TWICE DAILY WITH MEALS 180 Tab 0    levothyroxine (SYNTHROID) 75 mcg tablet TAKE 1 TABLET BY MOUTH DAILY BEFORE BREAKFAST 90 Tab 0    losartan (COZAAR) 25 mg tablet TAKE 1 TABLET BY MOUTH DAILY 90 Tab 0    simvastatin (ZOCOR) 40 mg tablet TAKE 1 TABLET BY MOUTH EVERY EVENING 90 Tab 1    OMEPRAZOLE (PRILOSEC PO) Take  by mouth daily.  cyanocobalamin (VITAMIN B-12) 2,500 mcg sublingual tablet Take 2,500 mcg by mouth daily.  cinnamon bark (CINNAMON) 500 mg cap Take  by mouth.  albuterol (PROAIR HFA) 90 mcg/actuation inhaler Take 2 Puffs by inhalation every six (6) hours as needed for Wheezing or Shortness of Breath.  1 Inhaler 3    furosemide (LASIX) 20 mg tablet Take 1 Tab by mouth daily as needed. 60 Tab 2    aspirin 81 mg tablet Take  by mouth daily. No current facility-administered medications on file prior to visit. Allergies   Allergen Reactions    Ace Inhibitors Anaphylaxis    Anectine [Succinylcholine Chloride] Shortness of Breath     With rash    Iodinated Contrast- Oral And Iv Dye Hives    Vicodin [Hydrocodone-Acetaminophen] Itching         Health Maintenance:   Health Maintenance   Topic Date Due    ZOSTER VACCINE AGE 60>  10/12/2016    EYE EXAM RETINAL OR DILATED Q1  03/16/2017    BREAST CANCER SCRN MAMMOGRAM  08/14/2017    MICROALBUMIN Q1  11/03/2017    FOOT EXAM Q1  03/31/2018    Influenza Age 5 to Adult  08/01/2018    COLONOSCOPY  08/23/2018    HEMOGLOBIN A1C Q6M  09/16/2018    LIPID PANEL Q1  11/10/2018    PAP AKA CERVICAL CYTOLOGY  10/05/2019    DTaP/Tdap/Td series (2 - Td) 06/23/2025    Hepatitis C Screening  Completed    Pneumococcal 19-64 Medium Risk  Completed       Objective:  Visit Vitals    /80 (BP 1 Location: Left arm, BP Patient Position: Sitting)    Pulse 64    Temp 98 °F (36.7 °C) (Oral)    Resp 18    Ht 5' 4\" (1.626 m)    Wt 177 lb (80.3 kg)    LMP 10/16/2010    SpO2 98%    BMI 30.38 kg/m2          Nurses notes and VS reviewed.       Physical Examination: General appearance - alert, well appearing, and in no distress  Chest - clear to auscultation, no wheezes, rales or rhonchi, symmetric air entry  Heart - normal rate, regular rhythm, normal S1, S2, no murmurs, rubs, clicks or gallops  Extremities - pedal edema 2 +  Diabetic foot exam:     Left:    Filament test absent sensation with micro filament   Pulse DP: 1+ (weak)   Pulse PT: 1+ (weak)   Deformities: None  Right:    Filament test absent sensation with micro filament   Pulse DP: 1+ (weak)   Pulse PT: 1+ (weak)   Deformities: None          Labwork and Ancillary Studies:    CBC w/Diff  Lab Results   Component Value Date/Time    WBC 7.5 03/16/2018 11:04 AM    HGB 11.1 (L) 03/16/2018 11:04 AM    PLATELET 169 59/58/2815 11:04 AM         Basic Metabolic Profile  Lab Results   Component Value Date/Time    Sodium 138 11/03/2016 07:12 AM    Potassium 4.5 11/03/2016 07:12 AM    Chloride 98 11/03/2016 07:12 AM    CO2 25 11/03/2016 07:12 AM    Anion gap 15.0 11/03/2016 07:12 AM    Glucose 95 11/03/2016 07:12 AM    BUN 10 11/03/2016 07:12 AM    Creatinine 0.6 11/03/2016 07:12 AM    Calcium 9.6 11/03/2016 07:12 AM         LFT  Lab Results   Component Value Date/Time    ALT (SGPT) 15 11/03/2016 07:12 AM    AST (SGOT) 19 11/03/2016 07:12 AM    Alk.  phosphatase 77 11/03/2016 07:12 AM    Bilirubin, direct <0.2 11/03/2016 07:12 AM    Bilirubin, total 0.3 11/03/2016 07:12 AM         Cholesterol  Lab Results   Component Value Date/Time    Cholesterol, total 174 11/03/2016 07:12 AM    HDL Cholesterol 65 (H) 11/03/2016 07:12 AM    LDL, calculated 82 11/03/2016 07:12 AM    Triglyceride 137 11/03/2016 07:12 AM

## 2018-08-10 NOTE — PROGRESS NOTES
Daysi Johnston is a 65 y.o. female (: 1955) presenting to address:    Chief Complaint   Patient presents with    Hypertension    Diabetes    Cholesterol Problem       Vitals:    08/10/18 0802   BP: 140/80   Pulse: 64   Resp: 18   Temp: 98 °F (36.7 °C)   TempSrc: Oral   SpO2: 98%   Weight: 177 lb (80.3 kg)   Height: 5' 4\" (1.626 m)   PainSc:   2   PainLoc: Generalized   LMP: 10/16/2010       Learning Assessment:     Learning Assessment 2015   PRIMARY LEARNER Patient   HIGHEST LEVEL OF EDUCATION - PRIMARY LEARNER  2 YEARS OF COLLEGE   BARRIERS PRIMARY LEARNER VISUAL   CO-LEARNER CAREGIVER No   PRIMARY LANGUAGE ENGLISH   LEARNER PREFERENCE PRIMARY DEMONSTRATION   ANSWERED BY self   RELATIONSHIP SELF     Depression Screening:     PHQ over the last two weeks 8/10/2018   Little interest or pleasure in doing things Not at all   Feeling down, depressed, irritable, or hopeless Not at all   Total Score PHQ 2 0     Fall Risk Assessment:     Fall Risk Assessment, last 12 mths 8/10/2018   Able to walk? Yes   Fall in past 12 months? Yes   Fall with injury? No   Number of falls in past 12 months 3   Fall Risk Score 3     Abuse Screening:     Abuse Screening Questionnaire 8/10/2018   Do you ever feel afraid of your partner? N   Are you in a relationship with someone who physically or mentally threatens you? N   Is it safe for you to go home? Y     Coordination of Care Questionaire:   1. Have you been to the ER, urgent care clinic since your last visit? Hospitalized since your last visit? YES [facility]    2. Have you seen or consulted any other health care providers outside of the [facility] since your last visit? Include any pap smears or colon screening. NO    Advanced Directive:   1. Do you have an Advanced Directive? YES    2. Would you like information on Advanced Directives?  NO

## 2018-08-17 LAB — MAMMOGRAPHY, EXTERNAL: NORMAL

## 2018-08-25 DIAGNOSIS — E03.4 HYPOTHYROIDISM DUE TO ACQUIRED ATROPHY OF THYROID: ICD-10-CM

## 2018-08-25 DIAGNOSIS — I10 ESSENTIAL HYPERTENSION: ICD-10-CM

## 2018-08-25 DIAGNOSIS — E11.49 OTHER DIABETIC NEUROLOGICAL COMPLICATION ASSOCIATED WITH TYPE 2 DIABETES MELLITUS (HCC): ICD-10-CM

## 2018-08-28 RX ORDER — PIOGLITAZONE HCL AND METFORMIN HCL 500; 15 MG/1; MG/1
TABLET ORAL
Qty: 180 TAB | Refills: 0 | Status: SHIPPED | OUTPATIENT
Start: 2018-08-28 | End: 2018-11-29 | Stop reason: SDUPTHER

## 2018-08-28 RX ORDER — LEVOTHYROXINE SODIUM 75 UG/1
TABLET ORAL
Qty: 90 TAB | Refills: 0 | Status: SHIPPED | OUTPATIENT
Start: 2018-08-28 | End: 2018-11-29 | Stop reason: SDUPTHER

## 2018-08-28 RX ORDER — LOSARTAN POTASSIUM 25 MG/1
TABLET ORAL
Qty: 90 TAB | Refills: 0 | Status: SHIPPED | OUTPATIENT
Start: 2018-08-28 | End: 2018-11-29 | Stop reason: SDUPTHER

## 2018-08-30 LAB — AMB DEXA, EXTERNAL: NORMAL

## 2018-09-07 ENCOUNTER — OFFICE VISIT (OUTPATIENT)
Dept: FAMILY MEDICINE CLINIC | Age: 63
End: 2018-09-07

## 2018-09-07 VITALS
HEART RATE: 64 BPM | DIASTOLIC BLOOD PRESSURE: 70 MMHG | RESPIRATION RATE: 12 BRPM | TEMPERATURE: 97.8 F | WEIGHT: 177.6 LBS | HEIGHT: 64 IN | BODY MASS INDEX: 30.32 KG/M2 | SYSTOLIC BLOOD PRESSURE: 128 MMHG | OXYGEN SATURATION: 98 %

## 2018-09-07 DIAGNOSIS — E11.49 OTHER DIABETIC NEUROLOGICAL COMPLICATION ASSOCIATED WITH TYPE 2 DIABETES MELLITUS (HCC): ICD-10-CM

## 2018-09-07 DIAGNOSIS — R07.9 CHEST PAIN, UNSPECIFIED TYPE: ICD-10-CM

## 2018-09-07 DIAGNOSIS — R00.2 PALPITATIONS: Primary | ICD-10-CM

## 2018-09-07 DIAGNOSIS — R06.02 SOB (SHORTNESS OF BREATH) ON EXERTION: ICD-10-CM

## 2018-09-07 DIAGNOSIS — Z00.00 ANNUAL PHYSICAL EXAM: ICD-10-CM

## 2018-09-07 DIAGNOSIS — Z23 ENCOUNTER FOR IMMUNIZATION: ICD-10-CM

## 2018-09-07 NOTE — PROGRESS NOTES
Assessment/Plan: *Diagnoses and all orders for this visit: 1. Palpitations -     AMB POC EKG ROUTINE W/ 12 LEADS, INTER & REP 2. Chest pain, unspecified type 
-     ECHO ADULT COMPLETE; Future 
-     NUCLEAR CARDIAC STRESS TEST; Future 3. SOB (shortness of breath) on exertion 
-     ECHO ADULT COMPLETE; Future 
-     NUCLEAR CARDIAC STRESS TEST; Future 4. Encounter for immunization -     Influenza virus vaccine (QUADRIVALENT PRES FREE SYRINGE) IM ([id number]) -     LA IMMUNIZ ADMIN,1 SINGLE/COMB VAC/TOXOID 5. Other diabetic neurological complication associated with type 2 diabetes mellitus ([facility] [address] 
-     HEMOGLOBIN A1C W/O EAG; Future -     MICROALBUMIN, UR, RAND W/ MICROALB/CREAT RATIO; Future 6. Annual physical exam 
-     CBC WITH AUTOMATED DIFF; Future 
-     HEPATIC FUNCTION PANEL; Future -     LIPID PANEL; Future -     METABOLIC PANEL, BASIC; Future 
-     TSH 3RD GENERATION; Future -     T4, FREE; Future -     URINALYSIS W/ RFLX MICROSCOPIC; Future -     VITAMIN D, 25 HYDROXY; Future Will await results of above. Physical in Dec.  BW. The plan was discussed with the patient. The patient verbalized understanding and is in agreement with the plan. All medication potential side effects were discussed with the patient. 
 
------------------------------------------------------------------------------------------------------------------- Douglas Demarco is a 65 y.o. female and presents with Follow-up (heart palpitations) Subjective: 
Pt here for on going issue with chest pain, SOB on exertion, palpitations. She has noted this over the last month or more, she has several risk factors for CAD like DM, HTN, HLD and has prior hx of CHF several years ago. HTN: controlled. ROS: 
Review of Systems - Negative except chest pain, SOB The problem list was updated as a part of today's visit. Patient Active Problem List  
Diagnosis Code  HTN (hypertension) I10  
 Hyperlipidemia E78.5  Goiter E04.9  Sleep apnea G47.30  GERD (gastroesophageal reflux disease) K21.9  Macular degeneration H35.30  Fatty liver K76.0  Allergic rhinitis J30.9  Family history of colon cancer Z80.0  Peripheral neuropathy G62.9  Hypothyroidism E03.9  Microalbuminuria due to type 2 diabetes mellitus ([facility] [address] E11.29, R80.9  Diabetic neuropathy (HCC) E11.40  Type 2 diabetes mellitus with proliferative retinopathy ([facility] Q37.6972 The PSH, FH were reviewed. SH: Social History Substance Use Topics  Smoking status: Former Smoker Packs/day: 0.10 Years: 0.50 Types: Cigarettes Quit date: 1/1/1975  Smokeless tobacco: Never Used  Alcohol use Yes Comment: 3 glasses of wine per year Medications/Allergies: 
Current Outpatient Prescriptions on File Prior to Visit Medication Sig Dispense Refill  levothyroxine (SYNTHROID) 75 mcg tablet TAKE 1 TABLET BY MOUTH DAILY BEFORE BREAKFAST 90 Tab 0  
 losartan (COZAAR) 25 mg tablet TAKE 1 TABLET BY MOUTH DAILY 90 Tab 0  pioglitazone-metFORMIN (ACTOPLUS MET)  mg per tablet TAKE 1 TABLET BY MOUTH TWICE DAILY WITH MEALS 180 Tab 0  
 B.infantis-B.ani-B.long-B.bifi (PROBIOTIC 4X) 10-15 mg TbEC Take  by mouth daily.  gabapentin (NEURONTIN) 300 mg capsule TAKE 2 CAPSULES BY MOUTH THREE TIMES DAILY 180 Cap 4  
 simvastatin (ZOCOR) 40 mg tablet TAKE 1 TABLET BY MOUTH EVERY EVENING 90 Tab 1  
 OMEPRAZOLE (PRILOSEC PO) Take  by mouth daily.  cyanocobalamin (VITAMIN B-12) 2,500 mcg sublingual tablet Take 2,500 mcg by mouth daily.  furosemide (LASIX) 20 mg tablet Take 1 Tab by mouth daily as needed. 60 Tab 2  
 aspirin 81 mg tablet Take  by mouth daily.  cinnamon bark (CINNAMON) 500 mg cap Take  by mouth.     
 albuterol (PROAIR HFA) 90 mcg/actuation inhaler Take 2 Puffs by inhalation every six (6) hours as needed for Wheezing or Shortness of Breath. 1 Inhaler 3 No current facility-administered medications on file prior to visit. Allergies Allergen Reactions  Ace Inhibitors Anaphylaxis  Anectine [Succinylcholine Chloride] Shortness of Breath With rash  Iodinated Contrast- Oral And Iv Dye Hives  Vicodin [Hydrocodone-Acetaminophen] Itching Health Maintenance:  
Health Maintenance Topic Date Due  
 ZOSTER VACCINE AGE 60>  10/12/2016  
 EYE EXAM RETINAL OR DILATED Q1  03/16/2017  BREAST CANCER SCRN MAMMOGRAM  08/14/2017  Influenza Age 5 to Adult  08/01/2018  COLONOSCOPY  08/23/2018  LIPID PANEL Q1  11/10/2018  HEMOGLOBIN A1C Q6M  02/10/2019  
 FOOT EXAM Q1  08/10/2019  MICROALBUMIN Q1  08/10/2019  PAP AKA CERVICAL CYTOLOGY  10/05/2019  
 DTaP/Tdap/Td series (2 - Td) 06/23/2025  Hepatitis C Screening  Completed  Pneumococcal 19-64 Medium Risk  Completed Objective: 
Visit Vitals  /70  Pulse 64  Temp 97.8 °F (36.6 °C) (Oral)  Resp 12  Ht 5' 4\" (1.626 m)  Wt 177 lb 9.6 oz (80.6 kg)  LMP 10/16/2010  SpO2 98%  BMI 30.48 kg/m2 Nurses notes and VS reviewed. Physical Examination: General appearance - alert, well appearing, and in no distress Chest - clear to auscultation, no wheezes, rales or rhonchi, symmetric air entry Heart - normal rate, regular rhythm, normal S1, S2, no murmurs, rubs, clicks or gallops Labwork and Ancillary Studies: CBC w/Diff Lab Results Component Value Date/Time WBC 7.5 03/16/2018 11:04 AM  
 HGB 11.1 (L) 03/16/2018 11:04 AM  
 PLATELET 238 96/17/6227 11:04 AM  
  
 
 Basic Metabolic Profile Lab Results Component Value Date/Time  Sodium 138 11/03/2016 07:12 AM  
 Potassium 4.5 11/03/2016 07:12 AM  
 Chloride 98 11/03/2016 07:12 AM  
 CO2 25 11/03/2016 07:12 AM  
 Anion gap 15.0 11/03/2016 07:12 AM  
 Glucose 95 11/03/2016 07:12 AM  
 BUN 10 11/03/2016 07:12 AM  
 Creatinine 0.6 11/03/2016 07:12 AM  
 Calcium 9.6 11/03/2016 07:12 AM  
  
  
LFT Lab Results Component Value Date/Time ALT (SGPT) 15 11/03/2016 07:12 AM  
 AST (SGOT) 19 11/03/2016 07:12 AM  
 Alk. phosphatase 77 11/03/2016 07:12 AM  
 Bilirubin, direct <0.2 11/03/2016 07:12 AM  
 Bilirubin, total 0.3 11/03/2016 07:12 AM  
 
 
 
Cholesterol Lab Results Component Value Date/Time  Cholesterol, total 174 11/03/2016 07:12 AM  
 HDL Cholesterol 65 (H) 11/03/2016 07:12 AM  
 LDL, calculated 82 11/03/2016 07:12 AM  
 Triglyceride 137 11/03/2016 07:12 AM

## 2018-09-07 NOTE — MR AVS SNAPSHOT
[address] [address] [facility] [id number] [phone] Patient: Sonw Coronado MRN: [id number] :1955 Visit Information Date & Time Provider Department Dept. Phone Encounter #  
 2018  9:00 AM Sandi العراقي, 3 [facility] [phone] [id number] Upcoming Health Maintenance Date Due ZOSTER VACCINE AGE 60> 10/12/2016 EYE EXAM RETINAL OR DILATED Q1 3/16/2017 BREAST CANCER SCRN MAMMOGRAM 2017 Influenza Age 5 to Adult 2018 COLONOSCOPY 2018 LIPID PANEL Q1 11/10/2018 HEMOGLOBIN A1C Q6M 2/10/2019 FOOT EXAM Q1 8/10/2019 MICROALBUMIN Q1 8/10/2019 PAP AKA CERVICAL CYTOLOGY 10/5/2019 DTaP/Tdap/Td series (2 - Td) 2025 Allergies as of 2018  Review Complete On: 2018 By: Sandi العراقي MD  
  
 Severity Noted Reaction Type Reactions Ace Inhibitors High 10/20/2010   Systemic Anaphylaxis Anectine [Succinylcholine Chloride] High 10/20/2010    Shortness of Breath With rash Iodinated Contrast- Oral And Iv Dye High 10/20/2010   Systemic Hives Vicodin [Hydrocodone-acetaminophen]  10/14/2014    Itching Current Immunizations  Reviewed on 2017 Name Date Influenza Vaccine 10/29/2015 Influenza Vaccine (Quad) PF  Incomplete, 2017  3:57 PM, 11/15/2016  3:57 PM  
 Influenza Vaccine Split 2012 Pneumococcal Polysaccharide (PPSV-23) 11/15/2016  3:56 PM  
 Tdap 2015  3:43 PM  
  
 Not reviewed this visit You Were Diagnosed With   
  
 Codes Comments Palpitations    -  Primary ICD-10-CM: R00.2 ICD-9-CM: 785.1 Chest pain, unspecified type     ICD-10-CM: R07.9 ICD-9-CM: 786.50 SOB (shortness of breath) on exertion     ICD-10-CM: R06.02 
ICD-9-CM: 786.05 Encounter for immunization     ICD-10-CM: F87 ICD-9-CM: V03.89 Vitals BP Pulse Temp Resp Height(growth percentile) Weight(growth percentile) 128/70 64 97.8 °F (36.6 °C) (Oral) 12 5' 4\" (1.626 m) 177 lb 9.6 oz (80.6 kg) LMP SpO2 BMI OB Status Smoking Status 10/16/2010 98% 30.48 kg/m2 Hysterectomy Former Smoker BMI and BSA Data Body Mass Index Body Surface Area  
 30.48 kg/m 2 1.91 m 2 Preferred Pharmacy Pharmacy Name Phone [facility], [address]  [facility] [phone] Your Updated Medication List  
  
   
This list is accurate as of 9/7/18  9:59 AM.  Always use your most recent med list.  
  
  
  
  
 albuterol 90 mcg/actuation inhaler Commonly known as:  PROAIR HFA Take 2 Puffs by inhalation every six (6) hours as needed for Wheezing or Shortness of Breath. aspirin 81 mg tablet Take  by mouth daily. CINNAMON 500 mg Cap Generic drug:  cinnamon bark Take  by mouth. furosemide 20 mg tablet Commonly known as:  LASIX Take 1 Tab by mouth daily as needed. gabapentin 300 mg capsule Commonly known as:  NEURONTIN  
TAKE 2 CAPSULES BY MOUTH THREE TIMES DAILY  
  
 levothyroxine 75 mcg tablet Commonly known as:  SYNTHROID  
TAKE 1 TABLET BY MOUTH DAILY BEFORE BREAKFAST  
  
 losartan 25 mg tablet Commonly known as:  COZAAR  
TAKE 1 TABLET BY MOUTH DAILY pioglitazone-metFORMIN  mg per tablet Commonly known as:  ACTOPLUS MET  
TAKE 1 TABLET BY MOUTH TWICE DAILY WITH MEALS  
  
 PRILOSEC PO Take  by mouth daily. PROBIOTIC 4X 10-15 mg Tbec Generic drug:  B.infantis-B.ani-B.long-B.bifi Take  by mouth daily. simvastatin 40 mg tablet Commonly known as:  ZOCOR  
TAKE 1 TABLET BY MOUTH EVERY EVENING  
  
 VITAMIN B-12 2,500 mcg sublingual tablet Generic drug:  cyanocobalamin Take 2,500 mcg by mouth daily. We Performed the Following AMB POC EKG ROUTINE W/ 12 LEADS, INTER & REP [76226 CPT(R)] INFLUENZA VIRUS VAC QUAD,SPLIT,PRESV FREE SYRINGE IM [id number] CPT(R)] DC IMMUNIZ ADMIN,1 SINGLE/COMB VAC/TOXOID [id number] CPT(R)] To-Do List   
 09/07/2018 Echocardiography:  ECHO ADULT COMPLETE   
  
 09/07/2018 NM Stress:  NUCLEAR CARDIAC STRESS TEST Patient Instructions Chest Pain: Care Instructions Your Care Instructions There are many things that can cause chest pain. Some are not serious and will get better on their own in a few days. But some kinds of chest pain need more testing and treatment. Your doctor may have recommended a follow-up visit in the next 8 to 12 hours. If you are not getting better, you may need more tests or treatment. Even though your doctor has released you, you still need to watch for any problems. The doctor carefully checked you, but sometimes problems can develop later. If you have new symptoms or if your symptoms do not get better, get medical care right away. If you have worse or different chest pain or pressure that lasts more than 5 minutes or you passed out (lost consciousness), call 911 or seek other emergency help right away. A medical visit is only one step in your treatment. Even if you feel better, you still need to do what your doctor recommends, such as going to all suggested follow-up appointments and taking medicines exactly as directed. This will help you recover and help prevent future problems. How can you care for yourself at home? · Rest until you feel better. · Take your medicine exactly as prescribed. Call your doctor if you think you are having a problem with your medicine. · Do not drive after taking a prescription pain medicine. When should you call for help? Call 911 if: 
  · You passed out (lost consciousness).  
  · You have severe difficulty breathing.  
  · You have symptoms of a heart attack. These may include: ¨ Chest pain or pressure, or a strange feeling in your chest. 
¨ Sweating. ¨ Shortness of breath. ¨ Nausea or vomiting. ¨ Pain, pressure, or a strange feeling in your back, neck, jaw, or upper belly or in one or both shoulders or arms. ¨ Lightheadedness or sudden weakness. ¨ A fast or irregular heartbeat. After you call 911, the  may tell you to chew 1 adult-strength or 2 to 4 low-dose aspirin. Wait for an ambulance. Do not try to drive yourself.  
 Call your doctor today if: 
  · You have any trouble breathing.  
  · Your chest pain gets worse.  
  · You are dizzy or lightheaded, or you feel like you may faint.  
  · You are not getting better as expected.  
  · You are having new or different chest pain. Where can you learn more? Go to http://junito-grace.info/. Enter A120 in the search box to learn more about \"Chest Pain: Care Instructions. \" Current as of: November 20, 2017 Content Version: 11.7 © 9451-6331 [employer]. Care instructions adapted under license by [employer] (which disclaims liability or warranty for this information). If you have questions about a medical condition or this instruction, always ask your healthcare professional. [address] any warranty or liability for your use of this information. Introducing [facility]! Pablito Williamson introduces Innohub patient portal. Now you can access parts of your medical record, email your doctor's office, and request medication refills online. 1. In your internet browser, go to [email or web address] 2. Click on the First Time User? Click Here link in the Sign In box. You will see the New Member Sign Up page. 3. Enter your Innohub Access Code exactly as it appears below. You will not need to use this code after youve completed the sign-up process. If you do not sign up before the expiration date, you must request a new code. · Matrix-Bio Access Code: [email or web address] Expires: 11/8/2018  9:01 AM 
 
4. Enter the last four digits of your Social Security Number (xxxx) and Date of Birth (mm/dd/yyyy) as indicated and click Submit. You will be taken to the next sign-up page. 5. Create a Matrix-Bio ID. This will be your Matrix-Bio login ID and cannot be changed, so think of one that is secure and easy to remember. 6. Create a Matrix-Bio password. You can change your password at any time. 7. Enter your Password Reset Question and Answer. This can be used at a later time if you forget your password. 8. Enter your e-mail address. You will receive e-mail notification when new information is available in [address] 9. Click Sign Up. You can now view and download portions of your medical record. 10. Click the Download Summary menu link to download a portable copy of your medical information. If you have questions, please visit the Frequently Asked Questions section of the Matrix-Bio website. Remember, Matrix-Bio is NOT to be used for urgent needs. For medical emergencies, dial 911. Now available from your iPhone and Android! Please provide this summary of care documentation to your next provider. Your primary care clinician is listed as Steph 51. If you have any questions after today's visit, please call [phone].

## 2018-09-07 NOTE — PATIENT INSTRUCTIONS
Chest Pain: Care Instructions Your Care Instructions There are many things that can cause chest pain. Some are not serious and will get better on their own in a few days. But some kinds of chest pain need more testing and treatment. Your doctor may have recommended a follow-up visit in the next 8 to 12 hours. If you are not getting better, you may need more tests or treatment. Even though your doctor has released you, you still need to watch for any problems. The doctor carefully checked you, but sometimes problems can develop later. If you have new symptoms or if your symptoms do not get better, get medical care right away. If you have worse or different chest pain or pressure that lasts more than 5 minutes or you passed out (lost consciousness), call 911 or seek other emergency help right away. A medical visit is only one step in your treatment. Even if you feel better, you still need to do what your doctor recommends, such as going to all suggested follow-up appointments and taking medicines exactly as directed. This will help you recover and help prevent future problems. How can you care for yourself at home? · Rest until you feel better. · Take your medicine exactly as prescribed. Call your doctor if you think you are having a problem with your medicine. · Do not drive after taking a prescription pain medicine. When should you call for help? Call 911 if: 
  · You passed out (lost consciousness).  
  · You have severe difficulty breathing.  
  · You have symptoms of a heart attack. These may include: ¨ Chest pain or pressure, or a strange feeling in your chest. 
¨ Sweating. ¨ Shortness of breath. ¨ Nausea or vomiting. ¨ Pain, pressure, or a strange feeling in your back, neck, jaw, or upper belly or in one or both shoulders or arms. ¨ Lightheadedness or sudden weakness. ¨ A fast or irregular heartbeat.  
After you call 911, the  may tell you to chew 1 adult-strength or 2 to 4 low-dose aspirin. Wait for an ambulance. Do not try to drive yourself.  
 Call your doctor today if: 
  · You have any trouble breathing.  
  · Your chest pain gets worse.  
  · You are dizzy or lightheaded, or you feel like you may faint.  
  · You are not getting better as expected.  
  · You are having new or different chest pain. Where can you learn more? Go to http://junito-grace.info/. Enter A120 in the search box to learn more about \"Chest Pain: Care Instructions. \" Current as of: November 20, 2017 Content Version: 11.7 © 2231-3504 [employer]. Care instructions adapted under license by [employer] (which disclaims liability or warranty for this information). If you have questions about a medical condition or this instruction, always ask your healthcare professional. [address] any warranty or liability for your use of this information.

## 2018-09-07 NOTE — PROGRESS NOTES
Tin Petersen is a 65 y.o. female presents in office to Chief Complaint Patient presents with  Follow-up  
  heart palpitations Health Maintenance Due Topic Date Due  
 ZOSTER VACCINE AGE 60>  10/12/2016  
 EYE EXAM RETINAL OR DILATED Q1  03/16/2017  BREAST CANCER SCRN MAMMOGRAM  08/14/2017  Influenza Age 5 to Adult  08/01/2018  COLONOSCOPY  08/23/2018 1. Have you been to the ER, urgent care clinic since your last visit? Hospitalized since your last visit? No 
 
2. Have you seen or consulted any other health care providers outside of the [facility] since your last visit? Include any pap smears or colon screening. Yes Reason for visit: colonoscopy and bone density Do you have an [address]? Not on file but will bring her ACP Visit Vitals  /70  Pulse 64  Temp 97.8 °F (36.6 °C) (Oral)  Resp 12  Ht 5' 4\" (1.626 m)  Wt 177 lb 9.6 oz (80.6 kg)  SpO2 98%  BMI 30.48 kg/m2 Flu Immunization/s administered 9/7/2018 by Chhaya Roche in left deltoid. Patient tolerated procedure well. No reactions noted.

## 2018-09-19 DIAGNOSIS — Z12.39 BREAST CANCER SCREENING: ICD-10-CM

## 2018-09-21 DIAGNOSIS — Z78.0 POSTMENOPAUSAL: ICD-10-CM

## 2018-11-01 ENCOUNTER — TELEPHONE (OUTPATIENT)
Dept: FAMILY MEDICINE CLINIC | Age: 63
End: 2018-11-01

## 2018-11-02 NOTE — TELEPHONE ENCOUNTER
Please notify pt that her stress test was normal.  Her echo shows some mild mitral and tricuspid valve regurgitation but it is fine. Her pumping function of the heart is normal.  If she is still having SOB, I would refer her to cardiology. Let me know.

## 2018-11-02 NOTE — TELEPHONE ENCOUNTER
Patient notified of results still has fluttering no sob and said if her tests were find she doesn't need to see cardiology.

## 2018-11-04 ENCOUNTER — TELEPHONE (OUTPATIENT)
Dept: FAMILY MEDICINE CLINIC | Age: 63
End: 2018-11-04

## 2018-11-04 NOTE — TELEPHONE ENCOUNTER
Lab order is ready. Please correct pt's appt and let him know. He still needs to get labs done but the Nov appt should just be a f/u. My error, the physical is not until Feb 2019.

## 2018-11-29 DIAGNOSIS — E03.4 HYPOTHYROIDISM DUE TO ACQUIRED ATROPHY OF THYROID: ICD-10-CM

## 2018-11-29 DIAGNOSIS — E11.49 OTHER DIABETIC NEUROLOGICAL COMPLICATION ASSOCIATED WITH TYPE 2 DIABETES MELLITUS (HCC): ICD-10-CM

## 2018-11-29 DIAGNOSIS — I10 ESSENTIAL HYPERTENSION: ICD-10-CM

## 2018-11-30 ENCOUNTER — APPOINTMENT (OUTPATIENT)
Dept: PHYSICAL THERAPY | Age: 63
End: 2018-11-30

## 2018-11-30 LAB
25(OH)D3 SERPL-MCNC: 33.6 NG/ML (ref 32–100)
A-G RATIO,AGRAT: 1.4 RATIO (ref 1.1–2.6)
ABSOLUTE LYMPHOCYTE COUNT, 10803: 2.2 K/UL (ref 1–4.8)
ALBUMIN SERPL-MCNC: 3.9 G/DL (ref 3.5–5)
ALP SERPL-CCNC: 63 U/L (ref 40–120)
ALT SERPL-CCNC: 14 U/L (ref 5–40)
ANION GAP SERPL CALC-SCNC: 13 MMOL/L
AST SERPL W P-5'-P-CCNC: 17 U/L (ref 10–37)
AVG GLU, 10930: 117 MG/DL (ref 91–123)
BASOPHILS # BLD: 0 K/UL (ref 0–0.2)
BASOPHILS NFR BLD: 1 % (ref 0–2)
BILIRUB SERPL-MCNC: 0.3 MG/DL (ref 0.2–1.2)
BILIRUB UR QL: NEGATIVE
BILIRUBIN, DIRECT,CBIL: <0.2 MG/DL (ref 0–0.3)
BUN SERPL-MCNC: 13 MG/DL (ref 6–22)
CALCIUM SERPL-MCNC: 9.2 MG/DL (ref 8.4–10.5)
CHLORIDE SERPL-SCNC: 102 MMOL/L (ref 98–110)
CHOLEST SERPL-MCNC: 154 MG/DL (ref 110–200)
CO2 SERPL-SCNC: 27 MMOL/L (ref 20–32)
CREAT SERPL-MCNC: 0.6 MG/DL (ref 0.8–1.4)
CREATININE, URINE: 89 MG/DL
CREATININE, URINE: 89 MG/DL
EOSINOPHIL # BLD: 0.1 K/UL (ref 0–0.5)
EOSINOPHIL NFR BLD: 2 % (ref 0–6)
EPITHELIAL,EPSU: ABNORMAL /HPF (ref 0–2)
ERYTHROCYTE [DISTWIDTH] IN BLOOD BY AUTOMATED COUNT: 13.9 % (ref 10–15.5)
GFRAA, 66117: >60
GFRNA, 66118: >60
GLOBULIN,GLOB: 2.8 G/DL (ref 2–4)
GLUCOSE SERPL-MCNC: 93 MG/DL (ref 70–99)
GLUCOSE UR QL: NEGATIVE MG/DL
GRANULOCYTES,GRANS: 57 % (ref 40–75)
HBA1C MFR BLD HPLC: 5.7 % (ref 4.8–5.9)
HCT VFR BLD AUTO: 37.6 % (ref 35.1–48)
HDLC SERPL-MCNC: 2.4 MG/DL (ref 0–5)
HDLC SERPL-MCNC: 65 MG/DL (ref 40–59)
HGB BLD-MCNC: 11.6 G/DL (ref 11.7–16)
HGB UR QL STRIP: ABNORMAL
KETONES UR QL STRIP.AUTO: NEGATIVE MG/DL
LDLC SERPL CALC-MCNC: 66 MG/DL (ref 50–99)
LEUKOCYTE ESTERASE: ABNORMAL
LYMPHOCYTES, LYMLT: 33 % (ref 20–45)
MCH RBC QN AUTO: 30 PG (ref 26–34)
MCHC RBC AUTO-ENTMCNC: 31 G/DL (ref 31–36)
MCV RBC AUTO: 96 FL (ref 80–95)
MICROALB/CREAT RATIO, 140286: NORMAL MCG/MG OF CREATININE (ref 0–30)
MICROALBUMIN,URINE RANDOM 140054: <12 MCG/ML (ref 0.1–17)
MONOCYTES # BLD: 0.5 K/UL (ref 0.1–1)
MONOCYTES NFR BLD: 7 % (ref 3–12)
NEUTROPHILS # BLD AUTO: 3.8 K/UL (ref 1.8–7.7)
NITRITE UR QL STRIP.AUTO: NEGATIVE
PH UR STRIP: 5 PH (ref 5–8)
PLATELET # BLD AUTO: 238 K/UL (ref 140–440)
PMV BLD AUTO: 11 FL (ref 9–13)
POTASSIUM SERPL-SCNC: 5.1 MMOL/L (ref 3.5–5.5)
PROT SERPL-MCNC: 6.7 G/DL (ref 6.2–8.1)
PROT UR QL STRIP: NEGATIVE MG/DL
RBC # BLD AUTO: 3.9 M/UL (ref 3.8–5.2)
RBC #/AREA URNS HPF: ABNORMAL /HPF
SODIUM SERPL-SCNC: 142 MMOL/L (ref 133–145)
SP GR UR: 1.01 (ref 1–1.03)
T4 FREE SERPL-MCNC: 1.5 NG/DL (ref 0.9–1.8)
TRIGL SERPL-MCNC: 113 MG/DL (ref 40–149)
TSH SERPL DL<=0.005 MIU/L-ACNC: 3.81 MCU/ML (ref 0.27–4.2)
UROBILINOGEN UR STRIP-MCNC: <2 MG/DL
VLDLC SERPL CALC-MCNC: 23 MG/DL (ref 8–30)
WBC # BLD AUTO: 6.6 K/UL (ref 4–11)
WBC URNS QL MICRO: ABNORMAL /HPF (ref 0–2)

## 2018-11-30 RX ORDER — LEVOTHYROXINE SODIUM 75 UG/1
TABLET ORAL
Qty: 90 TAB | Refills: 0 | Status: SHIPPED | OUTPATIENT
Start: 2018-11-30 | End: 2019-03-05 | Stop reason: SDUPTHER

## 2018-11-30 RX ORDER — LOSARTAN POTASSIUM 25 MG/1
TABLET ORAL
Qty: 90 TAB | Refills: 0 | Status: SHIPPED | OUTPATIENT
Start: 2018-11-30 | End: 2019-03-05 | Stop reason: SDUPTHER

## 2018-11-30 RX ORDER — PIOGLITAZONE HCL AND METFORMIN HCL 500; 15 MG/1; MG/1
TABLET ORAL
Qty: 180 TAB | Refills: 0 | Status: SHIPPED | OUTPATIENT
Start: 2018-11-30 | End: 2019-03-05 | Stop reason: SDUPTHER

## 2018-12-07 ENCOUNTER — OFFICE VISIT (OUTPATIENT)
Dept: FAMILY MEDICINE CLINIC | Age: 63
End: 2018-12-07

## 2018-12-07 ENCOUNTER — HOSPITAL ENCOUNTER (OUTPATIENT)
Dept: PHYSICAL THERAPY | Age: 63
Discharge: HOME | End: 2018-12-07
Payer: COMMERCIAL

## 2018-12-07 VITALS
SYSTOLIC BLOOD PRESSURE: 100 MMHG | RESPIRATION RATE: 16 BRPM | OXYGEN SATURATION: 98 % | TEMPERATURE: 97.7 F | BODY MASS INDEX: 31.34 KG/M2 | DIASTOLIC BLOOD PRESSURE: 64 MMHG | WEIGHT: 183.6 LBS | HEIGHT: 64 IN | HEART RATE: 72 BPM

## 2018-12-07 DIAGNOSIS — Z00.00 ANNUAL PHYSICAL EXAM: Primary | ICD-10-CM

## 2018-12-07 PROCEDURE — 97162 PT EVAL MOD COMPLEX 30 MIN: CPT

## 2018-12-07 PROCEDURE — 97110 THERAPEUTIC EXERCISES: CPT

## 2018-12-07 NOTE — PROGRESS NOTES
[address] [place] [address], [facility] - Phone: [phone]  Fax: [phone] OF CARE / [facility] [facility]  Patient Name: Migel Painting : 1955   Medical   Diagnosis: L Plantar Fascitis, Peroneal Tendonitis Treatment Diagnosis: L Plantar Fascitis, Peroneal Tendonitis   Onset Date: 2018     Referral Source: Guilherme Chaves DPM Start of Care [facility]): 2018   Prior Hospitalization: See medical history Provider #: [id number]   Prior Level of Function: I with ADL's   Comorbidities: HBP, DM, Arthritis, Visual Impaired   Medications: Verified on Patient Summary List   The Plan of Care and following information is based on the information from the initial evaluation.   ==================================================================================  Assessment / key information:  Pt is 65 year female presenting with L posterior tibial tendonitis and plantar fasciitis. Pt reports symptoms have been present for 6 months and are described as burning, sharp, and stabbing. Previous treatment for this condition has included: cortisone injection by MD on 18 and use of L brace/splint. Pt denies any red flags at this time. Pain 0-9/10, worse with walking, standing, and running, Better with resting and wearing her brace/splint. Pt denies any N/T in the LE at this time. MRI Reveled posterior tendon dysfunction with ossicle and no severe arthritis (per MD report). Current exam findings: decreased fazal with ambulation and decreased heel strike, AROM L DF 5 degrees, PF 48 degrees, INV 35 degrees, EV 10 degrees, AROM R DF 20 degrees, 50 degrees PF, INV 35, EV 15. All strength MMT BL 5/5 grossly. TTP along the L posterior tendon and L plantar fascia. All special test negative. Sensation intact to light touch in the BL LEs.  Pt requests HEP only at this time secondary to having difficulty with transportation issues at this time.   ==================================================================================  Eval Complexity: History HIGH Complexity :3+ comorbidities / personal factors will impact the outcome/ POC ;  Examination  HIGH Complexity : 4+ Standardized tests and measures addressing body structure, function, activity limitation and / or participation in recreation ; Presentation MEDIUM Complexity : Evolving with changing characteristics ; Decision Making MEDIUM Complexity : FOTO score of 26-74; Overall Complexity MEDIUM  Problem List: pain affecting function, decrease ROM, edema affecting function, impaired gait/ balance, decrease ADL/ functional abilitiies, decrease activity tolerance, decrease flexibility/ joint mobility and decrease transfer abilities   Treatment Plan may include any combination of the following: Therapeutic exercise, Therapeutic activities, Neuromuscular re-education, Physical agent/modality, Gait/balance training, Manual therapy and Patient education  Patient / Family readiness to learn indicated by: asking questions, trying to perform skills and interest  Persons(s) to be included in education: patient (P)  Barriers to Learning/Limitations: None  Measures taken:    Patient Goal (s): \"Decrease pain\"   Patient self reported health status: good  Rehabilitation Potential: good   Short Term Goals:  To be accomplished in  1  treatments:  Pt will be educated and demonstrate I with HEP in order to improve functional ADL's and pain with daily living- goal met  Frequency / Duration:   Patient to be seen  For HEP only per pt request.  Patient / Caregiver education and instruction: exercises  G-Codes (GP): NA  Therapist Signature: Starla Locke PT, DPT   Date: 87/1/1383   Certification Period: NA Time: 11:58 AM   ==================================================================================  I certify that the above Physical Therapy Services are being furnished while the patient is under my care. I agree with the treatment plan and certify that this therapy is necessary. Physician Signature:        Date:       Time:     Please sign and return to [facility], [facility]. or you may fax the signed copy to [phone]. Thank you.

## 2018-12-07 NOTE — PATIENT INSTRUCTIONS
Well Visit, Women 48 to 72: Care Instructions Your Care Instructions Physical exams can help you stay healthy. Your doctor has checked your overall health and may have suggested ways to take good care of yourself. He or she also may have recommended tests. At home, you can help prevent illness with healthy eating, regular exercise, and other steps. Follow-up care is a key part of your treatment and safety. Be sure to make and go to all appointments, and call your doctor if you are having problems. It's also a good idea to know your test results and keep a list of the medicines you take. How can you care for yourself at home? · Reach and stay at a healthy weight. This will lower your risk for many problems, such as obesity, diabetes, heart disease, and high blood pressure. · Get at least 30 minutes of exercise on most days of the week. Walking is a good choice. You also may want to do other activities, such as running, swimming, cycling, or playing tennis or team sports. · Do not smoke. Smoking can make health problems worse. If you need help quitting, talk to your doctor about stop-smoking programs and medicines. These can increase your chances of quitting for good. · Protect your skin from too much sun. When you're outdoors from 10 a.m. to 4 p.m., stay in the shade or cover up with clothing and a hat with a wide brim. Wear sunglasses that block UV rays. Even when it's cloudy, put broad-spectrum sunscreen (SPF 30 or higher) on any exposed skin. · See a dentist one or two times a year for checkups and to have your teeth cleaned. · Wear a seat belt in the car. · Limit alcohol to 1 drink a day. Too much alcohol can cause health problems. Follow your doctor's advice about when to have certain tests. These tests can spot problems early. · Cholesterol.  Your doctor will tell you how often to have this done based on your age, family history, or other things that can increase your risk for heart attack and stroke. · Blood pressure. Have your blood pressure checked during a routine doctor visit. Your doctor will tell you how often to check your blood pressure based on your age, your blood pressure results, and other factors. · Mammogram. Ask your doctor how often you should have a mammogram, which is an X-ray of your breasts. A mammogram can spot breast cancer before it can be felt and when it is easiest to treat. · Pap test and pelvic exam. Ask your doctor how often you should have a Pap test. You may not need to have a Pap test as often as you used to. · Vision. Have your eyes checked every year or two or as often as your doctor suggests. Some experts recommend that you have yearly exams for glaucoma and other age-related eye problems starting at age 48. · Hearing. Tell your doctor if you notice any change in your hearing. You can have tests to find out how well you hear. · Diabetes. Ask your doctor whether you should have tests for diabetes. · Colon cancer. You should begin tests for colon cancer at age 48. You may have one of several tests. Your doctor will tell you how often to have tests based on your age and risk. Risks include whether you already had a precancerous polyp removed from your colon or whether your parents, sisters and brothers, or children have had colon cancer. · Thyroid disease. Talk to your doctor about whether to have your thyroid checked as part of a regular physical exam. Women have an increased chance of a thyroid problem. · Osteoporosis. You should begin tests for bone density at age 72. If you are younger than 72, ask your doctor whether you have factors that may increase your risk for this disease. You may want to have this test before age 72. · Heart attack and stroke risk. At least every 4 to 6 years, you should have your risk for heart attack and stroke assessed.  Your doctor uses factors such as your age, blood pressure, cholesterol, and whether you smoke or have diabetes to show what your risk for a heart attack or stroke is over the next 10 years. When should you call for help? Watch closely for changes in your health, and be sure to contact your doctor if you have any problems or symptoms that concern you. Where can you learn more? Go to http://junito-grace.info/. Enter U778 in the search box to learn more about \"Well Visit, Women 50 to 72: Care Instructions. \" Current as of: March 29, 2018 Content Version: 11.8 © 8795-8423 [employer]. Care instructions adapted under license by [employer] (which disclaims liability or warranty for this information). If you have questions about a medical condition or this instruction, always ask your healthcare professional. [address] any warranty or liability for your use of this information.

## 2018-12-07 NOTE — PROGRESS NOTES
Brooklyn Tripp is a 65 y.o. female (: 1955) presenting to address: 
Patient has already received the flu vaccine. Chief Complaint Patient presents with  Diabetes Vitals:  
 18 9306 BP: 100/64 Pulse: 72 Resp: 16 Temp: 97.7 °F (36.5 °C) TempSrc: Oral  
SpO2: 98% Weight: 183 lb 9.6 oz (83.3 kg) Height: 5' 4\" (1.626 m) PainSc:   3 PainLoc: Head LMP: 10/16/2010 Hearing/Vision: No exam data present Learning Assessment:  
 
Learning Assessment 2015 PRIMARY LEARNER Patient HIGHEST LEVEL OF EDUCATION - PRIMARY LEARNER  2 YEARS OF COLLEGE  
BARRIERS PRIMARY LEARNER VISUAL  
CO-LEARNER CAREGIVER No  
PRIMARY LANGUAGE ENGLISH  
LEARNER PREFERENCE PRIMARY DEMONSTRATION  
ANSWERED BY self RELATIONSHIP SELF Depression Screening: PHQ over the last two weeks 2018 Little interest or pleasure in doing things Not at all Feeling down, depressed, irritable, or hopeless Not at all Total Score PHQ 2 0 Fall Risk Assessment:  
 
Fall Risk Assessment, last 12 mths 8/10/2018 Able to walk? Yes Fall in past 12 months? Yes Fall with injury? No  
Number of falls in past 12 months 3 Fall Risk Score 3 Abuse Screening:  
 
Abuse Screening Questionnaire 8/10/2018 Do you ever feel afraid of your partner? Petra Ribera Are you in a relationship with someone who physically or mentally threatens you? Petra Ribera Is it safe for you to go home? Hema Rudolph Coordination of Care Questionaire: 1. Have you been to the ER, urgent care clinic since your last visit? Hospitalized since your last visit? NO 
 
2. Have you seen or consulted any other health care providers outside of the [address] [place] [address] since your last visit? Include any pap smears or colon screening. NO Advanced Directive: 1. Do you have an Advanced Directive? NO 
 
2. Would you like information on Advanced Directives?  NO

## 2018-12-07 NOTE — PROGRESS NOTES
PHYSICAL THERAPY - DAILY TREATMENT NOTE    Patient Name: Deneen Corcoran        Date: 2018  : 1955   yes Patient  Verified  Visit #:   1   of   1  Insurance: Payor: Etta Gandhi / Plan: [address] PPO / Product Type: PPO /      In time: 1100 Out time: 1200   Total Treatment Time: 60     Medicare/[facility] Time Tracking (below)   Total Timed Codes (min):  na 1:1 Treatment Time:  na     TREATMENT AREA =  No admission diagnoses are documented for this encounter. SUBJECTIVE  Pain Level (on 0 to 10 scale):  3  / 10   Medication Changes/New allergies or changes in medical history, any new surgeries or procedures?    no  If yes, update Summary List   Subjective Functional Status/Changes:  []  No changes reported     SEE POC         OBJECTIVE  20 min Therapeutic Exercise:  [x]  See flow sheet   Rationale:      increase ROM and increase strength to improve the patients ability to perform functional ADL's      min Patient Education:  yes  Established HEP   []  Progressed/Changed HEP based on: Other Objective/Functional Measures:    SEE POC     Post Treatment Pain Level (on 0 to 10) scale:   3  / 10     ASSESSMENT  Assessment/Changes in Function:     Evaluation Code Complexity: History HIGH Complexity :3+ comorbidities / personal factors will impact the outcome/ POC ; Examination HIGH Complexity : 4+ Standardized tests and measures addressing body structure, function, activity limitation and / or participation in recreation ; Presentation MEDIUM Complexity : Evolving with changing characteristics ; Decision Making MEDIUM Complexity : FOTO score of 26-74;  Complexity MEDIUM    Justification for Eval Code Complexity:  Patient History : HBP, DM, arthritis  Examination SEE ABOVE EXAM  Clinical Presentation: evolving pain  Clinical Decision Making : FOTO          []  See Progress Note/Recertification   Patient will continue to benefit from skilled PT services to modify and progress therapeutic interventions, address functional mobility deficits, address ROM deficits, address strength deficits, analyze and address soft tissue restrictions, analyze and cue movement patterns and assess and modify postural abnormalities to attain remaining goals.    Progress toward goals / Updated goals:    NA     PLAN  []  Upgrade activities as tolerated yes Continue plan of care   []  Discharge due to :    [x]  Other: Pt will be seen 1 time for HEP only per pt request       Therapist: Corin Johnson PT, DPT    Date: 12/7/2018 Time: 12:04 PM     Future Appointments   Date Time Provider [facility]   4/12/2019  7:45 AM Marya Walton MD [facility]

## 2018-12-07 NOTE — PROGRESS NOTES
SUBJECTIVE:  
65 y.o. female for annual routine checkup. Pt has been doing well, no new complaints. She has been seeing Dr. Ivone Archuleta for her LT foot. Received a CS injection and will be going to therapy. Current Outpatient Medications Medication Sig Dispense Refill  pioglitazone-metFORMIN (ACTOPLUS MET)  mg per tablet TAKE 1 TABLET BY MOUTH TWICE DAILY WITH MEALS 180 Tab 0  
 levothyroxine (SYNTHROID) 75 mcg tablet TAKE 1 TABLET BY MOUTH DAILY BEFORE BREAKFAST 90 Tab 0  
 losartan (COZAAR) 25 mg tablet TAKE 1 TABLET BY MOUTH DAILY 90 Tab 0  
 simvastatin (ZOCOR) 40 mg tablet TAKE 1 TABLET BY MOUTH EVERY EVENING 90 Tab 0  
 gabapentin (NEURONTIN) 300 mg capsule TAKE 2 CAPSULES BY MOUTH THREE TIMES DAILY 180 Cap 2  
 B.infantis-B.ani-B.long-B.bifi (PROBIOTIC 4X) 10-15 mg TbEC Take  by mouth daily.  OMEPRAZOLE (PRILOSEC PO) Take  by mouth daily.  aspirin 81 mg tablet Take  by mouth daily. Past Medical History:  
Diagnosis Date  Allergic rhinitis Dr Benton Beavers  Diabetes mellitus with renal complications (HCC)   
 & neuropathy  Diabetic neuropathy ([facility] 3/22/2016  Dilated cardiomyopathy ([facility] 1998 Dr Luz Reardon (11/02 nml size, EF 60%  Family history of colon cancer F  
 Fatty liver  GERD (gastroesophageal reflux disease)  Goiter Dr Yohannes Mcintyre HTN (hypertension)  Hyperlipidemia  Hypothyroidism 3/28/2011  Macular degeneration  Microalbuminuria due to type 2 diabetes mellitus ([facility] [address] 3/22/2016  Peripheral neuropathy  Sleep apnea   
 on CPAP Allergies: Ace inhibitors; Anectine [succinylcholine chloride]; Iodinated contrast- oral and iv dye; and Vicodin [hydrocodone-acetaminophen] Patient's last menstrual period was 10/16/2010. ROS:  Feeling well. No dyspnea or chest pain on exertion. No abdominal pain, change in bowel habits, black or bloody stools.   No urinary tract symptoms. GYN ROS: no breast pain or new or enlarging lumps on self exam. No neurological complaints. OBJECTIVE: The patient appears well, alert, oriented x 3, in no distress. Visit Vitals /64 (BP 1 Location: Left arm, BP Patient Position: Sitting) Pulse 72 Temp 97.7 °F (36.5 °C) (Oral) Resp 16 Ht 5' 4\" (1.626 m) Wt 183 lb 9.6 oz (83.3 kg) LMP 10/16/2010 SpO2 98% BMI 31.51 kg/m² General appearance: alert, cooperative, no distress, appears stated age Head: Normocephalic, without obvious abnormality, atraumatic Ears: normal TM's and external ear canals AU Throat: Lips, mucosa, and tongue normal. Teeth and gums normal 
Neck: supple, symmetrical, trachea midline, no adenopathy, thyroid: not enlarged, symmetric, no tenderness/mass/nodules, no carotid bruit and no JVD Back: symmetric, no curvature. ROM normal. No CVA tenderness. Lungs: clear to auscultation bilaterally Breasts: patient declines to have breast exam. 
Heart: regular rate and rhythm, S1, S2 normal, no murmur, click, rub or gallop Abdomen: soft, non-tender. Bowel sounds normal. No masses,  no organomegaly Extremities: extremities normal, atraumatic, no cyanosis or edema Pulses: 2+ and symmetric Skin: Skin color, texture, turgor normal. No rashes or lesions Neurological is normal, no focal findings. Lab Results Component Value Date/Time WBC 6.6 11/30/2018 08:46 AM  
 HGB 11.6 (L) 11/30/2018 08:46 AM  
 HCT 37.6 11/30/2018 08:46 AM  
 PLATELET 738 64/83/1682 08:46 AM  
 MCV 96 (H) 11/30/2018 08:46 AM  
 
 
 
Lab Results Component Value Date/Time Sodium 142 11/30/2018 08:46 AM  
 Potassium 5.1 11/30/2018 08:46 AM  
 Chloride 102 11/30/2018 08:46 AM  
 CO2 27 11/30/2018 08:46 AM  
 Anion gap 13.0 11/30/2018 08:46 AM  
 Glucose 93 11/30/2018 08:46 AM  
 BUN 13 11/30/2018 08:46 AM  
 Creatinine 0.6 (L) 11/30/2018 08:46 AM  
 Calcium 9.2 11/30/2018 08:46 AM  
 
 
 
Lab Results Component Value Date/Time ALT (SGPT) 14 11/30/2018 08:46 AM  
 AST (SGOT) 17 11/30/2018 08:46 AM  
 Alk. phosphatase 63 11/30/2018 08:46 AM  
 Bilirubin, direct <0.2 11/30/2018 08:46 AM  
 Bilirubin, total 0.3 11/30/2018 08:46 AM  
 
 
 
Lab Results Component Value Date/Time Cholesterol, total 154 11/30/2018 08:46 AM  
 HDL Cholesterol 65 (H) 11/30/2018 08:46 AM  
 LDL, calculated 66 11/30/2018 08:46 AM  
 VLDL, calculated 23 11/30/2018 08:46 AM  
 Triglyceride 113 11/30/2018 08:46 AM  
 
 
 
Lab Results Component Value Date/Time TSH 3.81 11/30/2018 08:46 AM  
 T4, Free 1.5 11/30/2018 08:46 AM  
 
 
 
Lab Results Component Value Date/Time VITAMIN D, 25-HYDROXY 33.6 11/30/2018 08:46 AM  
   
 
 
 
ASSESSMENT:  
Diagnoses and all orders for this visit: 
 
1. Annual physical exam 
 
 
 
 
PLAN:  
Mammogram: UTD. Colonoscopy: UTD. F/u 4 months. Just A1c. The plan was discussed with the patient. The patient verbalized understanding and is in agreement with the plan. All medication potential side effects were discussed with the patient.

## 2018-12-12 DIAGNOSIS — R06.02 SOB (SHORTNESS OF BREATH) ON EXERTION: ICD-10-CM

## 2018-12-12 DIAGNOSIS — R07.9 CHEST PAIN, UNSPECIFIED TYPE: ICD-10-CM

## 2019-01-01 NOTE — TELEPHONE ENCOUNTER
Last office note faxed no xray results in system
Rose Marie Called again to obtain office notes    Appointment on 9-21-17    Will need to cancel the appointment if they are not received today.
Rose Marie called for Office notes and any X-rays we have in relation to the MRI of patients shoulder     Fax : [phone]
none

## 2019-01-14 NOTE — PROGRESS NOTES
[address] [place] [address] Eric Gardner, [address] - Phone: [phone]  Fax: [phone] FOR PHYSICAL THERAPY          Patient Name: Areli Whitmore : 1955   Treatment/Medical Diagnosis: Foot pain [M79.673]   Onset Date: 2018    Referral Source: Moraima Velez DPM [place] [facility]): 18   Prior Hospitalization: See Medical History Provider #: [id number]   Prior Level of Function: I with ADL's   Comorbidities: HBP, DM, Arthritis, Visual Impaired   Medications: Verified on Patient Summary List   Visits from [facility]: 1 Missed Visits: CX:0  NS:0   Current Status:  Pt was seen on 18 for an IE for L foot pain. Patient was seen for her IE only per patient request and given HEP to perform at home. Pt was educated on the ability to return to therapy within 30 days of IE if further follow up visits were needed. Pt was only seen for her IE at this time. Pt will be DC from therapy. Assessments/Recommendations: Other: Patient will be DC from theray at this time. If you have any questions/comments please contact us directly at [phone]. Thank you for allowing us to assist in the care of your patient.     Therapist Signature: Marisol Reardon PT Date: 18   Reporting Period: NA Time: 1:19 PM

## 2019-02-14 RX ORDER — GABAPENTIN 300 MG/1
CAPSULE ORAL
Qty: 180 CAP | Refills: 0 | Status: SHIPPED | OUTPATIENT
Start: 2019-02-14 | End: 2019-03-17 | Stop reason: SDUPTHER

## 2019-03-05 DIAGNOSIS — E78.00 PURE HYPERCHOLESTEROLEMIA: ICD-10-CM

## 2019-03-05 DIAGNOSIS — E11.49 OTHER DIABETIC NEUROLOGICAL COMPLICATION ASSOCIATED WITH TYPE 2 DIABETES MELLITUS (HCC): ICD-10-CM

## 2019-03-05 DIAGNOSIS — I10 ESSENTIAL HYPERTENSION: ICD-10-CM

## 2019-03-05 DIAGNOSIS — E03.4 HYPOTHYROIDISM DUE TO ACQUIRED ATROPHY OF THYROID: ICD-10-CM

## 2019-03-05 RX ORDER — LEVOTHYROXINE SODIUM 75 UG/1
TABLET ORAL
Qty: 90 TAB | Refills: 0 | Status: SHIPPED | OUTPATIENT
Start: 2019-03-05 | End: 2019-04-07 | Stop reason: SDUPTHER

## 2019-03-05 RX ORDER — PIOGLITAZONE HCL AND METFORMIN HCL 500; 15 MG/1; MG/1
TABLET ORAL
Qty: 180 TAB | Refills: 0 | Status: SHIPPED | OUTPATIENT
Start: 2019-03-05 | End: 2019-04-07 | Stop reason: SDUPTHER

## 2019-03-05 RX ORDER — LOSARTAN POTASSIUM 25 MG/1
TABLET ORAL
Qty: 90 TAB | Refills: 0 | Status: SHIPPED | OUTPATIENT
Start: 2019-03-05 | End: 2019-06-06 | Stop reason: SDUPTHER

## 2019-03-05 RX ORDER — SIMVASTATIN 40 MG/1
TABLET, FILM COATED ORAL
Qty: 90 TAB | Refills: 0 | Status: SHIPPED | OUTPATIENT
Start: 2019-03-05 | End: 2019-05-15 | Stop reason: ALTCHOICE

## 2019-04-07 DIAGNOSIS — E03.4 HYPOTHYROIDISM DUE TO ACQUIRED ATROPHY OF THYROID: ICD-10-CM

## 2019-04-07 DIAGNOSIS — E11.49 OTHER DIABETIC NEUROLOGICAL COMPLICATION ASSOCIATED WITH TYPE 2 DIABETES MELLITUS (HCC): ICD-10-CM

## 2019-04-07 RX ORDER — PIOGLITAZONE HCL AND METFORMIN HCL 500; 15 MG/1; MG/1
TABLET ORAL
Qty: 180 TAB | Refills: 0 | Status: SHIPPED | OUTPATIENT
Start: 2019-04-07 | End: 2019-05-15 | Stop reason: SDUPTHER

## 2019-04-07 RX ORDER — LEVOTHYROXINE SODIUM 75 UG/1
TABLET ORAL
Qty: 90 TAB | Refills: 0 | Status: SHIPPED | OUTPATIENT
Start: 2019-04-07 | End: 2019-05-15 | Stop reason: SDUPTHER

## 2019-05-15 ENCOUNTER — OFFICE VISIT (OUTPATIENT)
Dept: FAMILY MEDICINE CLINIC | Age: 64
End: 2019-05-15

## 2019-05-15 VITALS
SYSTOLIC BLOOD PRESSURE: 122 MMHG | BODY MASS INDEX: 32.61 KG/M2 | HEART RATE: 67 BPM | WEIGHT: 191 LBS | OXYGEN SATURATION: 99 % | TEMPERATURE: 98.1 F | HEIGHT: 64 IN | RESPIRATION RATE: 18 BRPM | DIASTOLIC BLOOD PRESSURE: 78 MMHG

## 2019-05-15 DIAGNOSIS — E78.00 PURE HYPERCHOLESTEROLEMIA: ICD-10-CM

## 2019-05-15 DIAGNOSIS — I10 ESSENTIAL HYPERTENSION: ICD-10-CM

## 2019-05-15 DIAGNOSIS — E11.49 OTHER DIABETIC NEUROLOGICAL COMPLICATION ASSOCIATED WITH TYPE 2 DIABETES MELLITUS (HCC): Primary | ICD-10-CM

## 2019-05-15 DIAGNOSIS — E03.4 HYPOTHYROIDISM DUE TO ACQUIRED ATROPHY OF THYROID: ICD-10-CM

## 2019-05-15 LAB — HBA1C MFR BLD HPLC: 6 %

## 2019-05-15 RX ORDER — FUROSEMIDE 20 MG/1
20 TABLET ORAL DAILY
Qty: 90 TAB | Refills: 1 | Status: SHIPPED | OUTPATIENT
Start: 2019-05-15 | End: 2019-11-13 | Stop reason: SDUPTHER

## 2019-05-15 RX ORDER — ATORVASTATIN CALCIUM 10 MG/1
10 TABLET, FILM COATED ORAL DAILY
Qty: 90 TAB | Refills: 1 | Status: SHIPPED | OUTPATIENT
Start: 2019-05-15 | End: 2019-11-13 | Stop reason: SDUPTHER

## 2019-05-15 NOTE — PATIENT INSTRUCTIONS

## 2019-05-15 NOTE — PROGRESS NOTES
Assessment/Plan: *Diagnoses and all orders for this visit: 
 
1. Other diabetic neurological complication associated with type 2 diabetes mellitus ([facility] -     AMB POC HEMOGLOBIN A1C 2. Hypothyroidism due to acquired atrophy of thyroid 3. Pure hypercholesterolemia 4. Essential hypertension Other orders -     furosemide (LASIX) 20 mg tablet; Take 1 Tab by mouth daily. -     atorvastatin (LIPITOR) 10 mg tablet; Take 1 Tab by mouth daily. DM and HTN f/u in 4 months. Pt will monitor BP at home as we will hold off on BP meds for now. She does not want totake anything else at the moment. She wants to see if she really needs to be on meds. Will notify me if readings start climbing. Will get her back on Lasix for swelling. Will change to Lipitor and lower the dose since she had myalgias with Zocor 40 mg. The plan was discussed with the patient. The patient verbalized understanding and is in agreement with the plan. All medication potential side effects were discussed with the patient. 
 
------------------------------------------------------------------------------------------------------------------- Brittani Zapien is a 59 y.o. female and presents with Hypertension (stopped losartan due to recall and zocor because of leg cramps ); Diabetes; Foot Swelling; and Arm swelling (forearm) Subjective: 
Pt here for f/u. DM:  Well controlled, A1c 6.0%. She has not been compliant with getting her DM eye exam.  Every time I see her, I remind her of the importance of getting this done. Plus, her vision is poor. HTN:  Well controlled. HLD: at goal on last BW. Hypothyroidism:stable. ROS: 
Review of Systems - Negative The problem list was updated as a part of today's visit. Patient Active Problem List  
Diagnosis Code  
 HTN (hypertension) I10  
 Hyperlipidemia E78.5  Goiter E04.9  Sleep apnea G47.30  
  GERD (gastroesophageal reflux disease) K21.9  Macular degeneration H35.30  Fatty liver K76.0  Allergic rhinitis J30.9  Family history of colon cancer Z80.0  Peripheral neuropathy G62.9  Hypothyroidism E03.9  Microalbuminuria due to type 2 diabetes mellitus ([facility] E11.29, R80.9  Diabetic neuropathy (HCC) E11.40  Type 2 diabetes mellitus with proliferative retinopathy [facility] R49.1562 The PSH, FH were reviewed. SH: Social History Tobacco Use  Smoking status: Former Smoker Packs/day: 0.10 Years: 0.50 Pack years: 0.05 Types: Cigarettes Last attempt to quit: 1975 Years since quittin.3  Smokeless tobacco: Never Used Substance Use Topics  Alcohol use: Yes Comment: 3 glasses of wine per year  Drug use: No  
 
 
Medications/Allergies: 
Current Outpatient Medications on File Prior to Visit Medication Sig Dispense Refill  levothyroxine (SYNTHROID) 75 mcg tablet TAKE 1 TABLET BY MOUTH DAILY BEFORE BREAKFAST 90 Tab 0  pioglitazone-metFORMIN (ACTOPLUS MET)  mg per tablet TAKE 1 TABLET BY MOUTH TWICE DAILY WITH MEALS 180 Tab 0  
 gabapentin (NEURONTIN) 300 mg capsule TAKE 2 CAPSULES BY MOUTH THREE TIMES DAILY 180 Cap 2  
 B.infantis-B.ani-B.long-B.bifi (PROBIOTIC 4X) 10-15 mg TbEC Take  by mouth daily.  OMEPRAZOLE (PRILOSEC PO) Take 20 mg by mouth daily.  losartan (COZAAR) 25 mg tablet TAKE 1 TABLET BY MOUTH DAILY 90 Tab 0  
 aspirin 81 mg tablet Take  by mouth daily. No current facility-administered medications on file prior to visit. Allergies Allergen Reactions  Ace Inhibitors Anaphylaxis  Anectine [Succinylcholine Chloride] Shortness of Breath With rash  Iodinated Contrast- Oral And Iv Dye Hives  Vicodin [Hydrocodone-Acetaminophen] Itching Health Maintenance:  
Health Maintenance Topic Date Due  Shingrix Vaccine Age 50> (1 of 2) 2006  EYE EXAM RETINAL OR DILATED  03/16/2018  HEMOGLOBIN A1C Q6M  05/30/2019  BREAST CANCER SCRN MAMMOGRAM  08/17/2019  Influenza Age 5 to Adult  08/01/2019  
 FOOT EXAM Q1  10/05/2019  PAP AKA CERVICAL CYTOLOGY  10/05/2019  MICROALBUMIN Q1  11/30/2019  LIPID PANEL Q1  11/30/2019  COLONOSCOPY  08/23/2023  DTaP/Tdap/Td series (2 - Td) 06/23/2025  Hepatitis C Screening  Completed  Bone Densitometry (Dexa) Screening  Completed  Pneumococcal 0-64 years  Completed Objective: 
Visit Vitals /78 (BP 1 Location: Left arm, BP Patient Position: Sitting) Pulse 67 Temp 98.1 °F (36.7 °C) (Oral) Resp 18 Ht 5' 4\" (1.626 m) Wt 191 lb (86.6 kg) LMP 10/16/2010 SpO2 99% BMI 32.79 kg/m² Nurses notes and VS reviewed. Physical Examination: General appearance - alert, well appearing, and in no distress Chest - clear to auscultation, no wheezes, rales or rhonchi, symmetric air entry Heart - normal rate, regular rhythm, normal S1, S2, no murmurs, rubs, clicks or gallops Labwork and Ancillary Studies: CBC w/Diff Lab Results Component Value Date/Time WBC 6.6 11/30/2018 08:46 AM  
 HGB 11.6 (L) 11/30/2018 08:46 AM  
 PLATELET 370 24/18/8186 08:46 AM  
  
 
 Basic Metabolic Profile Lab Results Component Value Date/Time Sodium 142 11/30/2018 08:46 AM  
 Potassium 5.1 11/30/2018 08:46 AM  
 Chloride 102 11/30/2018 08:46 AM  
 CO2 27 11/30/2018 08:46 AM  
 Anion gap 13.0 11/30/2018 08:46 AM  
 Glucose 93 11/30/2018 08:46 AM  
 BUN 13 11/30/2018 08:46 AM  
 Creatinine 0.6 (L) 11/30/2018 08:46 AM  
 Calcium 9.2 11/30/2018 08:46 AM  
  
  
LFT Lab Results Component Value Date/Time ALT (SGPT) 14 11/30/2018 08:46 AM  
 AST (SGOT) 17 11/30/2018 08:46 AM  
 Alk. phosphatase 63 11/30/2018 08:46 AM  
 Bilirubin, direct <0.2 11/30/2018 08:46 AM  
 Bilirubin, total 0.3 11/30/2018 08:46 AM  
 
 
 
Cholesterol Lab Results Component Value Date/Time Cholesterol, total 154 11/30/2018 08:46 AM  
 HDL Cholesterol 65 (H) 11/30/2018 08:46 AM  
 LDL, calculated 66 11/30/2018 08:46 AM  
 Triglyceride 113 11/30/2018 08:46 AM

## 2019-05-15 NOTE — PROGRESS NOTES
Dale Cogan is a 59 y.o. female (: 1955) presenting to address: Chief Complaint Patient presents with  Hypertension  
  stopped losartan due to recall and zocor because of leg cramps  Diabetes  Foot Swelling  Arm swelling  
  forearm Vitals:  
 05/15/19 5054 BP: 122/78 Pulse: 67 Resp: 18 Temp: 98.1 °F (36.7 °C) TempSrc: Oral  
SpO2: 99% Weight: 191 lb (86.6 kg) Height: 5' 4\" (1.626 m) PainSc:   0 - No pain LMP: 10/16/2010 Hearing/Vision: No exam data present Learning Assessment:  
 
Learning Assessment 2015 PRIMARY LEARNER Patient HIGHEST LEVEL OF EDUCATION - PRIMARY LEARNER  2 YEARS OF COLLEGE  
BARRIERS PRIMARY LEARNER VISUAL  
CO-LEARNER CAREGIVER No  
PRIMARY LANGUAGE ENGLISH  
LEARNER PREFERENCE PRIMARY DEMONSTRATION  
ANSWERED BY self RELATIONSHIP SELF Depression Screening:  
 
3 most recent PHQ Screens 5/15/2019 Little interest or pleasure in doing things Not at all Feeling down, depressed, irritable, or hopeless Not at all Total Score PHQ 2 0 Fall Risk Assessment:  
 
Fall Risk Assessment, last 12 mths 8/10/2018 Able to walk? Yes Fall in past 12 months? Yes Fall with injury? No  
Number of falls in past 12 months 3 Fall Risk Score 3 Abuse Screening:  
 
Abuse Screening Questionnaire 8/10/2018 Do you ever feel afraid of your partner? [place] Are you in a relationship with someone who physically or mentally threatens you? [place] Is it safe for you to go home? [facility] Coordination of Care Questionaire: 1. Have you been to the ER, urgent care clinic since your last visit? Hospitalized since your last visit? NO 
 
2. Have you seen or consulted any other health care providers outside of the [address] [place] [address] since your last visit? Include any pap smears or colon screening. NO Advanced Directive: 1. Do you have an Advanced Directive? NO 
 
2. Would you like information on Advanced Directives?  NO

## 2019-09-30 ENCOUNTER — OFFICE VISIT (OUTPATIENT)
Dept: FAMILY MEDICINE CLINIC | Age: 64
End: 2019-09-30

## 2019-09-30 VITALS
WEIGHT: 182.6 LBS | TEMPERATURE: 96.7 F | DIASTOLIC BLOOD PRESSURE: 69 MMHG | SYSTOLIC BLOOD PRESSURE: 118 MMHG | RESPIRATION RATE: 12 BRPM | BODY MASS INDEX: 31.18 KG/M2 | HEIGHT: 64 IN | HEART RATE: 76 BPM | OXYGEN SATURATION: 98 %

## 2019-09-30 DIAGNOSIS — E11.49 OTHER DIABETIC NEUROLOGICAL COMPLICATION ASSOCIATED WITH TYPE 2 DIABETES MELLITUS (HCC): ICD-10-CM

## 2019-09-30 DIAGNOSIS — E78.00 PURE HYPERCHOLESTEROLEMIA: ICD-10-CM

## 2019-09-30 DIAGNOSIS — Z00.00 ANNUAL PHYSICAL EXAM: ICD-10-CM

## 2019-09-30 DIAGNOSIS — Z23 ENCOUNTER FOR IMMUNIZATION: ICD-10-CM

## 2019-09-30 DIAGNOSIS — G62.9 PERIPHERAL POLYNEUROPATHY: ICD-10-CM

## 2019-09-30 DIAGNOSIS — I10 ESSENTIAL HYPERTENSION: ICD-10-CM

## 2019-09-30 DIAGNOSIS — E03.4 HYPOTHYROIDISM DUE TO ACQUIRED ATROPHY OF THYROID: ICD-10-CM

## 2019-09-30 DIAGNOSIS — E11.49 OTHER DIABETIC NEUROLOGICAL COMPLICATION ASSOCIATED WITH TYPE 2 DIABETES MELLITUS (HCC): Primary | ICD-10-CM

## 2019-09-30 LAB — HBA1C MFR BLD HPLC: 6.3 %

## 2019-09-30 RX ORDER — GABAPENTIN 300 MG/1
600 CAPSULE ORAL 3 TIMES DAILY
Qty: 180 CAP | Refills: 3 | Status: SHIPPED | OUTPATIENT
Start: 2019-09-30 | End: 2020-01-24 | Stop reason: SDUPTHER

## 2019-09-30 RX ORDER — LANOLIN ALCOHOL/MO/W.PET/CERES
500 CREAM (GRAM) TOPICAL
COMMUNITY
End: 2020-01-24 | Stop reason: ALTCHOICE

## 2019-09-30 NOTE — PROGRESS NOTES
Assessment/Plan:    *Diagnoses and all orders for this visit:    1. Other diabetic neurological complication associated with type 2 diabetes mellitus (HCC)  -     AMB POC HEMOGLOBIN A1C  -     gabapentin (NEURONTIN) 300 mg capsule; Take 2 Caps by mouth three (3) times daily. Max Daily Amount: 1,800 mg.  -     REFERRAL TO NEUROLOGY  -     HEMOGLOBIN A1C W/O EAG; Future  -     MICROALBUMIN, UR, RAND W/ MICROALB/CREAT RATIO; Future    2. Peripheral polyneuropathy  -     REFERRAL TO NEUROLOGY    3. Hypothyroidism due to acquired atrophy of thyroid    4. Essential hypertension    5. Pure hypercholesterolemia    6. Encounter for immunization  -     INFLUENZA VIRUS VAC QUAD,SPLIT,PRESV FREE SYRINGE IM  -     CO IMMUNIZ ADMIN,1 SINGLE/COMB VAC/TOXOID    7. Annual physical exam  -     CBC WITH AUTOMATED DIFF; Future  -     HEPATIC FUNCTION PANEL; Future  -     LIPID PANEL; Future  -     METABOLIC PANEL, BASIC; Future  -     TSH 3RD GENERATION; Future  -     T4, FREE; Future  -     URINALYSIS W/ RFLX MICROSCOPIC; Future  -     VITAMIN D, 25 HYDROXY; Future        The plan was discussed with the patient. The patient verbalized understanding and is in agreement with the plan. All medication potential side effects were discussed with the patient.    -------------------------------------------------------------------------------------------------------------------        Hector Guzman is a 59 y.o. female and presents with Diabetes         Subjective:  Pt here for f/u. DM:  Well controlled, A1c 6.3%. Pt has been having issue with increase nerve pain. Has diabetic neuropathy and pain is worsening. HTN:  Well controlled. HLD: well controlled. Hypothyroidism: asymptomatic. ROS:  Review of Systems - Negative except as above        The problem list was updated as a part of today's visit.   Patient Active Problem List   Diagnosis Code    HTN (hypertension) I10    Hyperlipidemia E78.5    Goiter E04.9    Sleep apnea G47.30    GERD (gastroesophageal reflux disease) K21.9    Macular degeneration H35.30    Fatty liver K76.0    Allergic rhinitis J30.9    Family history of colon cancer Z80.0    Peripheral neuropathy G62.9    Hypothyroidism E03.9    Microalbuminuria due to type 2 diabetes mellitus (HCC) E11.29, R80.9    Diabetic neuropathy (HCC) E11.40    Type 2 diabetes mellitus with proliferative retinopathy ([facility] E53.0436       The PSH, FH were reviewed. SH:  Social History     Tobacco Use    Smoking status: Former Smoker     Packs/day: 0.10     Years: 0.50     Pack years: 0.05     Types: Cigarettes     Last attempt to quit: 1975     Years since quittin.7    Smokeless tobacco: Never Used   Substance Use Topics    Alcohol use: Yes     Comment: 3 glasses of wine per year    Drug use: No       Medications/Allergies:  Current Outpatient Medications on File Prior to Visit   Medication Sig Dispense Refill    OMEGA-3 FATTY ACIDS-VITAMIN E PO Take 1,000 mg by mouth.  Multivitamins with Fluoride (MULTI-VITAMIN PO) Take 1 Tab by mouth.  cyanocobalamin (VITAMIN B12) 500 mcg tablet Take 500 mcg by mouth.  losartan (COZAAR) 25 mg tablet TAKE 1 TABLET BY MOUTH DAILY 90 Tab 1    pioglitazone-metFORMIN (ACTOPLUS MET)  mg per tablet TAKE 1 TABLET BY MOUTH TWICE DAILY WITH MEALS 180 Tab 1    levothyroxine (SYNTHROID) 75 mcg tablet TAKE 1 TABLET BY MOUTH DAILY BEFORE BREAKFAST 90 Tab 1    furosemide (LASIX) 20 mg tablet Take 1 Tab by mouth daily. 90 Tab 1    B.infantis-B.ani-B.long-B.bifi (PROBIOTIC 4X) 10-15 mg TbEC Take  by mouth daily.  OMEPRAZOLE (PRILOSEC PO) Take 20 mg by mouth daily.  atorvastatin (LIPITOR) 10 mg tablet Take 1 Tab by mouth daily. (Patient taking differently: Take 10 mg by mouth daily. Indications: Pt states makes legs cramp) 90 Tab 1    aspirin 81 mg tablet Take  by mouth daily.        No current facility-administered medications on file prior to visit. Allergies   Allergen Reactions    Ace Inhibitors Anaphylaxis    Anectine [Succinylcholine Chloride] Shortness of Breath     With rash    Iodinated Contrast Media Hives    Vicodin [Hydrocodone-Acetaminophen] Itching         Health Maintenance:   Health Maintenance   Topic Date Due    Shingrix Vaccine Age 49> (1 of 2) 12/12/2006    EYE EXAM RETINAL OR DILATED  03/16/2018    Influenza Age 5 to Adult  08/01/2019    BREAST CANCER SCRN MAMMOGRAM  08/17/2019    FOOT EXAM Q1  10/05/2019    PAP AKA CERVICAL CYTOLOGY  10/05/2019    HEMOGLOBIN A1C Q6M  11/15/2019    MICROALBUMIN Q1  11/30/2019    LIPID PANEL Q1  11/30/2019    COLONOSCOPY  08/23/2023    DTaP/Tdap/Td series (2 - Td) 06/23/2025    Hepatitis C Screening  Completed    Bone Densitometry (Dexa) Screening  Completed    Pneumococcal 0-64 years  Completed       Objective:  Visit Vitals  /69   Pulse 76   Temp 96.7 °F (35.9 °C) (Oral)   Resp 12   Ht 5' 4\" (1.626 m)   Wt 182 lb 9.6 oz (82.8 kg)   LMP 10/16/2010   SpO2 98%   BMI 31.34 kg/m²          Nurses notes and VS reviewed.       Physical Examination: General appearance - alert, well appearing, and in no distress  Chest - clear to auscultation, no wheezes, rales or rhonchi, symmetric air entry  Heart - normal rate, regular rhythm, normal S1, S2, no murmurs, rubs, clicks or gallops         Labwork and Ancillary Studies:    CBC w/Diff  Lab Results   Component Value Date/Time    WBC 6.6 11/30/2018 08:46 AM    HGB 11.6 (L) 11/30/2018 08:46 AM    PLATELET 424 39/32/0955 08:46 AM         Basic Metabolic Profile  Lab Results   Component Value Date/Time    Sodium 142 11/30/2018 08:46 AM    Potassium 5.1 11/30/2018 08:46 AM    Chloride 102 11/30/2018 08:46 AM    CO2 27 11/30/2018 08:46 AM    Anion gap 13.0 11/30/2018 08:46 AM    Glucose 93 11/30/2018 08:46 AM    BUN 13 11/30/2018 08:46 AM    Creatinine 0.6 (L) 11/30/2018 08:46 AM    Calcium 9.2 11/30/2018 08:46 AM         LFT  Lab Results Component Value Date/Time    ALT (SGPT) 14 11/30/2018 08:46 AM    AST (SGOT) 17 11/30/2018 08:46 AM    Alk.  phosphatase 63 11/30/2018 08:46 AM    Bilirubin, direct <0.2 11/30/2018 08:46 AM    Bilirubin, total 0.3 11/30/2018 08:46 AM         Cholesterol  Lab Results   Component Value Date/Time    Cholesterol, total 154 11/30/2018 08:46 AM    HDL Cholesterol 65 (H) 11/30/2018 08:46 AM    LDL, calculated 66 11/30/2018 08:46 AM    Triglyceride 113 11/30/2018 08:46 AM

## 2019-09-30 NOTE — PROGRESS NOTES
[address] presents today for   Chief Complaint   Patient presents with    Diabetes     Visit Vitals  /69   Pulse 76   Temp 96.7 °F (35.9 °C) (Oral)   Resp 12   Ht 5' 4\" (1.626 m)   Wt 182 lb 9.6 oz (82.8 kg)   SpO2 98%   BMI 31.34 kg/m²         Arlin Davison preferred language for health care discussion is english/other. Is someone accompanying this pt? no    Is the patient using any DME equipment during OV? no    Depression Screening:  3 most recent PHQ Screens 5/15/2019 9/7/2018 8/10/2018 3/31/2017 11/15/2016 6/20/2016 6/23/2015   Little interest or pleasure in doing things Not at all Not at all Not at all Not at all Not at all Not at all Not at all   Feeling down, depressed, irritable, or hopeless Not at all Not at all Not at all Not at all Not at all Not at all Not at all   Total Score PHQ 2 0 0 0 0 0 0 0       Learning Assessment:  Learning Assessment 6/23/2015 3/17/2014 9/10/2013   PRIMARY LEARNER Patient Patient Patient   HIGHEST LEVEL OF EDUCATION - PRIMARY LEARNER  [address] - -   CO-LEARNER CAREGIVER No - -   [address] [facility] [facility]   LEARNER PREFERENCE PRIMARY DEMONSTRATION VIDEOS VIDEOS   ANSWERED BY self PATIENT patient   RELATIONSHIP SELF SELF SELF       Abuse Screening:  Abuse Screening Questionnaire 8/10/2018 3/17/2014   Do you ever feel afraid of your partner? N N   Are you in a relationship with someone who physically or mentally threatens you? N N   Is it safe for you to go home? Y Y       Fall Risk  Fall Risk Assessment, last 12 mths 8/10/2018   Able to walk? Yes   Fall in past 12 months? Yes   Fall with injury? No   Number of falls in past 12 months 3   Fall Risk Score 3       Health Maintenance reviewed and discussed per provider.  Yes    Arlin Davison is due for   Health Maintenance Due   Topic Date Due    Shingrix Vaccine Age 50> (1 of 2) 12/12/2006    EYE EXAM RETINAL OR DILATED 03/16/2018    Influenza Age 9 to Adult  08/01/2019    BREAST CANCER SCRN MAMMOGRAM  08/17/2019    FOOT EXAM Q1  10/05/2019    PAP AKA CERVICAL CYTOLOGY  10/05/2019         Please order/place referral if appropriate. Advance Directive:  1. Do you have an advance directive in place? Patient Reply: no    2. If not, would you like material regarding how to put one in place? Patient Reply: no    Coordination of Care:  1. Have you been to the ER, urgent care clinic since your last visit? Hospitalized since your last visit? no    2. Have you seen or consulted any other health care providers outside of the [address] [place] [address] since your last visit? Include any pap smears or colon screening.  no

## 2019-09-30 NOTE — PATIENT INSTRUCTIONS
Vaccine Information Statement    Influenza (Flu) Vaccine (Inactivated or Recombinant): What You Need to Know    Many Vaccine Information Statements are available in [place] and other languages. See www.immunize.org/vis  Hojas de información sobre vacunas están disponibles en español y en muchos otros idiomas. Visite www.immunize.org/vis    1. Why get vaccinated? Influenza vaccine can prevent influenza (flu). Flu is a contagious disease that spreads around the [facility] every year, usually between October and May. Anyone can get the flu, but it is more dangerous for some people. Infants and young children, people 72years of age and older, pregnant women, and people with certain health conditions or a weakened immune system are at greatest risk of flu complications. Pneumonia, bronchitis, sinus infections and ear infections are examples of flu-related complications. If you have a medical condition, such as heart disease, cancer or diabetes, flu can make it worse. Flu can cause fever and chills, sore throat, muscle aches, fatigue, cough, headache, and runny or stuffy nose. Some people may have vomiting and diarrhea, though this is more common in children than adults. Each year thousands of people in the [facility] die from flu, and many more are hospitalized. Flu vaccine prevents millions of illnesses and flu-related visits to the doctor each year. 2. Influenza vaccines     CDC recommends everyone 10months of age and older get vaccinated every flu season. Children 6 months through 6years of age may need 2 doses during a single flu season. Everyone else needs only 1 dose each flu season. It takes about 2 weeks for protection to develop after vaccination. There are many flu viruses, and they are always changing. Each year a new flu vaccine is made to protect against three or four viruses that are likely to cause disease in the upcoming flu season.  Even when the vaccine doesnt exactly match these viruses, it may still provide some protection. Influenza vaccine does not cause flu. Influenza vaccine may be given at the same time as other vaccines. 3. Talk with your health care provider    Tell your vaccine provider if the person getting the vaccine:   Has had an allergic reaction after a previous dose of influenza vaccine, or has any severe, life-threatening allergies.  Has ever had Guillain-Barré Syndrome (also called GBS). In some cases, your health care provider may decide to postpone influenza vaccination to a future visit. People with minor illnesses, such as a cold, may be vaccinated. People who are moderately or severely ill should usually wait until they recover before getting influenza vaccine. Your health care provider can give you more information. 4. Risks of a reaction     Soreness, redness, and swelling where shot is given, fever, muscle aches, and headache can happen after influenza vaccine.  There may be a very small increased risk of Guillain-Barré Syndrome (GBS) after inactivated influenza vaccine (the flu shot). [facility] children who get the flu shot along with pneumococcal vaccine (PCV13), and/or DTaP vaccine at the same time might be slightly more likely to have a seizure caused by fever. Tell your health care provider if a child who is getting flu vaccine has ever had a seizure. People sometimes faint after medical procedures, including vaccination. Tell your provider if you feel dizzy or have vision changes or ringing in the ears. As with any medicine, there is a very remote chance of a vaccine causing a severe allergic reaction, other serious injury, or death. 5. What if there is a serious problem? An allergic reaction could occur after the vaccinated person leaves the clinic.  If you see signs of a severe allergic reaction (hives, swelling of the face and throat, difficulty breathing, a fast heartbeat, dizziness, or weakness), call 9-1-1 and get the person to the nearest hospital.    For other signs that concern you, call your health care provider. Adverse reactions should be reported to the Vaccine Adverse Event Reporting System (VAERS). Your health care provider will usually file this report, or you can do it yourself. Visit the VAERS website at www.vaers. [facility].gov or call 6-630.845.2773. VAERS is only for reporting reactions, and VAERS staff do not give medical advice. 6. The National Vaccine Injury Compensation Program    The [facility] Vaccine Injury Compensation Program (VICP) is a federal program that was created to compensate people who may have been injured by certain vaccines. Visit the VICP website at www.[facility].gov/vaccinecompensation or call 4-915.899.8646 to learn about the program and about filing a claim. There is a time limit to file a claim for compensation. 7. How can I learn more?  Ask your health care provider.  Call your local or state health department.  Contact the Centers for Disease Control and Prevention (CDC):  - Call 9-685.569.1432 (1-800-CDC-INFO) or  - Visit CDCs influenza website at www.cdc.gov/flu    Vaccine Information Statement (Interim)  Inactivated Influenza Vaccine   8/15/2019  [address] Snyder [id number]   Department of Health and Human Services  Centers for Disease Control and Prevention    Office Use Only         Learning About Meal Planning for Diabetes  Why plan your meals? Meal planning can be a key part of managing diabetes. Planning meals and snacks with the right balance of carbohydrate, protein, and fat can help you keep your blood sugar at the target level you set with your doctor. You don't have to eat special foods. You can eat what your family eats, including sweets once in a while. But you do have to pay attention to how often you eat and how much you eat of certain foods. You may want to work with a dietitian or a certified diabetes educator.  He or she can give you tips and meal ideas and can answer your questions about meal planning. This health professional can also help you reach a healthy weight if that is one of your goals. What plan is right for you? Your dietitian or diabetes educator may suggest that you start with the plate format or carbohydrate counting. The plate format  The plate format is a simple way to help you manage how you eat. You plan meals by learning how much space each food should take on a plate. Using the plate format helps you spread carbohydrate throughout the day. It can make it easier to keep your blood sugar level within your target range. It also helps you see if you're eating healthy portion sizes. To use the plate format, you put non-starchy vegetables on half your plate. Add meat or meat substitutes on one-quarter of the plate. Put a grain or starchy vegetable (such as brown rice or a potato) on the final quarter of the plate. You can add a small piece of fruit and some low-fat or fat-free milk or yogurt, depending on your carbohydrate goal for each meal.  Here are some tips for using the plate format:  · Make sure that you are not using an oversized plate. A 9-inch plate is best. Many restaurants use larger plates. · Get used to using the plate format at home. Then you can use it when you eat out. · Write down your questions about using the plate format. Talk to your doctor, a dietitian, or a diabetes educator about your concerns. Carbohydrate counting  With carbohydrate counting, you plan meals based on the amount of carbohydrate in each food. Carbohydrate raises blood sugar higher and more quickly than any other nutrient. It is found in desserts, breads and cereals, and fruit. It's also found in starchy vegetables such as potatoes and corn, grains such as rice and pasta, and milk and yogurt. Spreading carbohydrate throughout the day helps keep your blood sugar levels within your target range.   Your daily amount depends on several things, including your weight, how active you are, which diabetes medicines you take, and what your goals are for your blood sugar levels. A registered dietitian or diabetes educator can help you plan how much carbohydrate to include in each meal and snack. A guideline for your daily amount of carbohydrate is:  · 45 to 60 grams at each meal. That's about the same as 3 to 4 carbohydrate servings. · 15 to 20 grams at each snack. That's about the same as 1 carbohydrate serving. The Nutrition Facts label on packaged foods tells you how much carbohydrate is in a serving of the food. First, look at the serving size on the food label. Is that the amount you eat in a serving? All of the nutrition information on a food label is based on that serving size. So if you eat more or less than that, you'll need to adjust the other numbers. Total carbohydrate is the next thing you need to look for on the label. If you count carbohydrate servings, one serving of carbohydrate is 15 grams. For foods that don't come with labels, such as fresh fruits and vegetables, you'll need a guide that lists carbohydrate in these foods. Ask your doctor, dietitian, or diabetes educator about books or other nutrition guides you can use. If you take insulin, you need to know how many grams of carbohydrate are in a meal. This lets you know how much rapid-acting insulin to take before you eat. If you use an insulin pump, you get a constant rate of insulin during the day. So the pump must be programmed at meals to give you extra insulin to cover the rise in blood sugar after meals. When you know how much carbohydrate you will eat, you can take the right amount of insulin. Or, if you always use the same amount of insulin, you need to make sure that you eat the same amount of carbohydrate at meals. If you need more help to understand carbohydrate counting and food labels, ask your doctor, dietitian, or diabetes educator.   How do you get started with meal planning? Here are some tips to get started:  · Plan your meals a week at a time. Don't forget to include snacks too. · Use cookbooks or online recipes to plan several main meals. Plan some quick meals for busy nights. You also can double some recipes that freeze well. Then you can save half for other busy nights when you don't have time to cook. · Make sure you have the ingredients you need for your recipes. If you're running low on basic items, put these items on your shopping list too. · List foods that you use to make breakfasts, lunches, and snacks. List plenty of fruits and vegetables. · Post this list on the refrigerator. Add to it as you think of more things you need. · Take the list to the store to do your weekly shopping. Follow-up care is a key part of your treatment and safety. Be sure to make and go to all appointments, and call your doctor if you are having problems. It's also a good idea to know your test results and keep a list of the medicines you take. Where can you learn more? Go to http://junito-grace.info/. Frida Syed in the search box to learn more about \"Learning About Meal Planning for Diabetes. \"  Current as of: April 16, 2019  Content Version: 12.2  © 7458-2543 [employer]. Care instructions adapted under license by [employer] (which disclaims liability or warranty for this information). If you have questions about a medical condition or this instruction, always ask your healthcare professional. [address] any warranty or liability for your use of this information.

## 2019-11-14 DIAGNOSIS — E11.49 OTHER DIABETIC NEUROLOGICAL COMPLICATION ASSOCIATED WITH TYPE 2 DIABETES MELLITUS (HCC): ICD-10-CM

## 2019-11-15 RX ORDER — PIOGLITAZONE HCL AND METFORMIN HCL 500; 15 MG/1; MG/1
TABLET ORAL
Qty: 180 TAB | Refills: 1 | Status: SHIPPED | OUTPATIENT
Start: 2019-11-15 | End: 2020-06-23

## 2020-01-15 DIAGNOSIS — E03.4 HYPOTHYROIDISM DUE TO ACQUIRED ATROPHY OF THYROID: ICD-10-CM

## 2020-01-15 RX ORDER — LEVOTHYROXINE SODIUM 75 UG/1
TABLET ORAL
Qty: 90 TAB | Refills: 1 | Status: SHIPPED | OUTPATIENT
Start: 2020-01-15 | End: 2020-07-21

## 2020-01-21 LAB
25(OH)D3 SERPL-MCNC: 25.8 NG/ML (ref 32–100)
A-G RATIO,AGRAT: 1.5 RATIO (ref 1.1–2.6)
ABSOLUTE LYMPHOCYTE COUNT, 10803: 2.7 K/UL (ref 1–4.8)
ALBUMIN SERPL-MCNC: 3.9 G/DL (ref 3.5–5)
ALP SERPL-CCNC: 75 U/L (ref 40–120)
ALT SERPL-CCNC: 16 U/L (ref 5–40)
ANION GAP SERPL CALC-SCNC: 13 MMOL/L
AST SERPL W P-5'-P-CCNC: 20 U/L (ref 10–37)
AVG GLU, 10930: 142 MG/DL (ref 91–123)
BASOPHILS # BLD: 0.1 K/UL (ref 0–0.2)
BASOPHILS NFR BLD: 1 % (ref 0–2)
BILIRUB SERPL-MCNC: 0.3 MG/DL (ref 0.2–1.2)
BILIRUB UR QL: NEGATIVE
BILIRUBIN, DIRECT,CBIL: <0.2 MG/DL (ref 0–0.3)
BUN SERPL-MCNC: 12 MG/DL (ref 6–22)
CALCIUM SERPL-MCNC: 9 MG/DL (ref 8.4–10.5)
CHLORIDE SERPL-SCNC: 103 MMOL/L (ref 98–110)
CHOLEST SERPL-MCNC: 238 MG/DL (ref 110–200)
CO2 SERPL-SCNC: 26 MMOL/L (ref 20–32)
CREAT SERPL-MCNC: 0.9 MG/DL (ref 0.8–1.4)
CREATININE, URINE: 72 MG/DL
EOSINOPHIL # BLD: 0.2 K/UL (ref 0–0.5)
EOSINOPHIL NFR BLD: 3 % (ref 0–6)
EPITHELIAL,EPSU: ABNORMAL /HPF (ref 0–2)
ERYTHROCYTE [DISTWIDTH] IN BLOOD BY AUTOMATED COUNT: 13.2 % (ref 10–15.5)
GFRAA, 66117: >60
GFRNA, 66118: 59.4
GLOBULIN,GLOB: 2.6 G/DL (ref 2–4)
GLUCOSE SERPL-MCNC: 96 MG/DL (ref 70–99)
GLUCOSE UR QL: NEGATIVE MG/DL
GRANULOCYTES,GRANS: 45 % (ref 40–75)
HBA1C MFR BLD HPLC: 6.6 % (ref 4.8–5.6)
HCT VFR BLD AUTO: 36.3 % (ref 35.1–48)
HDLC SERPL-MCNC: 4.3 MG/DL (ref 0–5)
HDLC SERPL-MCNC: 55 MG/DL
HGB BLD-MCNC: 11.3 G/DL (ref 11.7–16)
HGB UR QL STRIP: NEGATIVE
KETONES UR QL STRIP.AUTO: NEGATIVE MG/DL
LDL/HDL RATIO,LDHD: 2.7
LDLC SERPL CALC-MCNC: 149 MG/DL (ref 50–99)
LEUKOCYTE ESTERASE: ABNORMAL
LYMPHOCYTES, LYMLT: 41 % (ref 20–45)
MCH RBC QN AUTO: 29 PG (ref 26–34)
MCHC RBC AUTO-ENTMCNC: 31 G/DL (ref 31–36)
MCV RBC AUTO: 94 FL (ref 81–99)
MICROALB/CREAT RATIO, 140286: NORMAL
MICROALBUMIN,URINE RANDOM 140054: <12 MG/L (ref 0.1–17)
MONOCYTES # BLD: 0.6 K/UL (ref 0.1–1)
MONOCYTES NFR BLD: 10 % (ref 3–12)
NEUTROPHILS # BLD AUTO: 2.9 K/UL (ref 1.8–7.7)
NITRITE UR QL STRIP.AUTO: NEGATIVE
NON-HDL CHOLESTEROL, 011976: 183 MG/DL
PH UR STRIP: 5 PH (ref 5–8)
PLATELET # BLD AUTO: 241 K/UL (ref 140–440)
PMV BLD AUTO: 11.5 FL (ref 9–13)
POTASSIUM SERPL-SCNC: 4.8 MMOL/L (ref 3.5–5.5)
PROT SERPL-MCNC: 6.5 G/DL (ref 6.2–8.1)
PROT UR QL STRIP: NEGATIVE MG/DL
RBC # BLD AUTO: 3.85 M/UL (ref 3.8–5.2)
RBC #/AREA URNS HPF: NEGATIVE /HPF
SODIUM SERPL-SCNC: 142 MMOL/L (ref 133–145)
SP GR UR: 1.01 (ref 1–1.03)
T4 FREE SERPL-MCNC: 1.1 NG/DL (ref 0.9–1.8)
TRIGL SERPL-MCNC: 169 MG/DL (ref 40–149)
TSH SERPL DL<=0.005 MIU/L-ACNC: 7.75 MCU/ML (ref 0.27–4.2)
UROBILINOGEN UR STRIP-MCNC: <2 MG/DL
VLDLC SERPL CALC-MCNC: 34 MG/DL (ref 8–30)
WBC # BLD AUTO: 6.4 K/UL (ref 4–11)
WBC URNS QL MICRO: ABNORMAL /HPF (ref 0–2)

## 2020-01-24 ENCOUNTER — OFFICE VISIT (OUTPATIENT)
Dept: FAMILY MEDICINE CLINIC | Age: 65
End: 2020-01-24

## 2020-01-24 VITALS
BODY MASS INDEX: 31.41 KG/M2 | RESPIRATION RATE: 16 BRPM | HEART RATE: 62 BPM | WEIGHT: 184 LBS | TEMPERATURE: 96.7 F | SYSTOLIC BLOOD PRESSURE: 126 MMHG | OXYGEN SATURATION: 98 % | DIASTOLIC BLOOD PRESSURE: 68 MMHG | HEIGHT: 64 IN

## 2020-01-24 DIAGNOSIS — I10 ESSENTIAL HYPERTENSION: ICD-10-CM

## 2020-01-24 DIAGNOSIS — E78.00 PURE HYPERCHOLESTEROLEMIA: ICD-10-CM

## 2020-01-24 DIAGNOSIS — E11.49 OTHER DIABETIC NEUROLOGICAL COMPLICATION ASSOCIATED WITH TYPE 2 DIABETES MELLITUS (HCC): ICD-10-CM

## 2020-01-24 DIAGNOSIS — E03.4 HYPOTHYROIDISM DUE TO ACQUIRED ATROPHY OF THYROID: ICD-10-CM

## 2020-01-24 DIAGNOSIS — Z00.00 ANNUAL PHYSICAL EXAM: Primary | ICD-10-CM

## 2020-01-24 RX ORDER — LOSARTAN POTASSIUM 25 MG/1
TABLET ORAL
Qty: 30 TAB | Refills: 5 | Status: SHIPPED | OUTPATIENT
Start: 2020-01-24 | End: 2020-08-20

## 2020-01-24 RX ORDER — GABAPENTIN 300 MG/1
600 CAPSULE ORAL 3 TIMES DAILY
Qty: 180 CAP | Refills: 5 | Status: SHIPPED | OUTPATIENT
Start: 2020-01-24 | End: 2020-02-04

## 2020-01-24 NOTE — PATIENT INSTRUCTIONS
Well Visit, Women 48 to 72: Care Instructions Your Care Instructions Physical exams can help you stay healthy. Your doctor has checked your overall health and may have suggested ways to take good care of yourself. He or she also may have recommended tests. At home, you can help prevent illness with healthy eating, regular exercise, and other steps. Follow-up care is a key part of your treatment and safety. Be sure to make and go to all appointments, and call your doctor if you are having problems. It's also a good idea to know your test results and keep a list of the medicines you take. How can you care for yourself at home? · Reach and stay at a healthy weight. This will lower your risk for many problems, such as obesity, diabetes, heart disease, and high blood pressure. · Get at least 30 minutes of exercise on most days of the week. Walking is a good choice. You also may want to do other activities, such as running, swimming, cycling, or playing tennis or team sports. · Do not smoke. Smoking can make health problems worse. If you need help quitting, talk to your doctor about stop-smoking programs and medicines. These can increase your chances of quitting for good. · Protect your skin from too much sun. When you're outdoors from 10 a.m. to 4 p.m., stay in the shade or cover up with clothing and a hat with a wide brim. Wear sunglasses that block UV rays. Even when it's cloudy, put broad-spectrum sunscreen (SPF 30 or higher) on any exposed skin. · See a dentist one or two times a year for checkups and to have your teeth cleaned. · Wear a seat belt in the car. Follow your doctor's advice about when to have certain tests. These tests can spot problems early. · Cholesterol. Your doctor will tell you how often to have this done based on your age, family history, or other things that can increase your risk for heart attack and stroke. · Blood pressure. Have your blood pressure checked during a routine doctor visit. Your doctor will tell you how often to check your blood pressure based on your age, your blood pressure results, and other factors. · Mammogram. Ask your doctor how often you should have a mammogram, which is an X-ray of your breasts. A mammogram can spot breast cancer before it can be felt and when it is easiest to treat. · Pap test and pelvic exam. Ask your doctor how often you should have a Pap test. You may not need to have a Pap test as often as you used to. · Vision. Have your eyes checked every year or two or as often as your doctor suggests. Some experts recommend that you have yearly exams for glaucoma and other age-related eye problems starting at age 48. · Hearing. Tell your doctor if you notice any change in your hearing. You can have tests to find out how well you hear. · Diabetes. Ask your doctor whether you should have tests for diabetes. · Colorectal cancer. Your risk for colorectal cancer gets higher as you get older. Some experts say that adults should start regular screening at age 48 and stop at age 76. Others say to start before age 48 or continue after age 76. Talk with your doctor about your risk and when to start and stop screening. · Thyroid disease. Talk to your doctor about whether to have your thyroid checked as part of a regular physical exam. Women have an increased chance of a thyroid problem. · Osteoporosis. You should begin tests for bone density at age 72. If you are younger than 72, ask your doctor whether you have factors that may increase your risk for this disease. You may want to have this test before age 72. · Heart attack and stroke risk. At least every 4 to 6 years, you should have your risk for heart attack and stroke assessed.  Your doctor uses factors such as your age, blood pressure, cholesterol, and whether you smoke or have diabetes to show what your risk for a heart attack or stroke is over the next 10 years. When should you call for help? Watch closely for changes in your health, and be sure to contact your doctor if you have any problems or symptoms that concern you. Where can you learn more? Go to http://junito-grace.info/. Enter O890 in the search box to learn more about \"Well Visit, Women 50 to 72: Care Instructions. \" Current as of: December 13, 2018 Content Version: 12.2 © 7749-4399 [employer]. Care instructions adapted under license by [employer] (which disclaims liability or warranty for this information). If you have questions about a medical condition or this instruction, always ask your healthcare professional. [address] any warranty or liability for your use of this information.

## 2020-01-24 NOTE — PROGRESS NOTES
SUBJECTIVE:   62 y.o. female for annual routine checkup. Pt stopped taking her Lipitor because of leg cramps. This has only been a problem at night, never in the day so I do not think her cramps are related to Lipitor. She missed Synthroid the last 3 weeks prior to her labs, had an issue with the pharmacy. Vit D is still low and she is not taking anything. Current Outpatient Medications   Medication Sig Dispense Refill    gabapentin (NEURONTIN) 300 mg capsule Take 2 Caps by mouth three (3) times daily. Max Daily Amount: 1,800 mg. 180 Cap 5    losartan (COZAAR) 25 mg tablet TAKE 1 TABLET BY MOUTH DAILY 30 Tab 5    levothyroxine (SYNTHROID) 75 mcg tablet TAKE 1 TABLET BY MOUTH DAILY BEFORE BREAKFAST 90 Tab 1    pioglitazone-metFORMIN (ACTOPLUS MET)  mg per tablet TAKE 1 TABLET BY MOUTH TWICE DAILY WITH MEALS 180 Tab 1    atorvastatin (LIPITOR) 10 mg tablet Take 1 Tab by mouth daily. 90 Tab 1    furosemide (LASIX) 20 mg tablet TAKE 1 TABLET BY MOUTH DAILY (Patient taking differently: as needed.) 90 Tab 1    OMEGA-3 FATTY ACIDS-VITAMIN E PO Take 1,000 mg by mouth.  B.infantis-B.ani-B.long-B.bifi (PROBIOTIC 4X) 10-15 mg TbEC Take  by mouth daily.  OMEPRAZOLE (PRILOSEC PO) Take 20 mg by mouth daily. Past Medical History:   Diagnosis Date    Allergic rhinitis     Dr May Johnson Diabetes mellitus with renal complications [facility])     & neuropathy    [facility]22[facility]2016    Dilated cardiomyopathy ([facility] 1998    Dr Amado Rosas (11/02 nml size, EF 60%    Family history of colon cancer     F    Fatty liver     GERD (gastroesophageal reflux disease)     Goiter     Dr Paxton Chua HTN (hypertension)     Hyperlipidemia     Hypothyroidism 3/28/2011    Macular degeneration     Microalbuminuria due to type 2 diabetes mellitus ([facility] 3/22/2016    Peripheral neuropathy     Sleep apnea     on CPAP       Allergies: Ace inhibitors; Anectine [succinylcholine chloride];  Iodinated contrast media; and Vicodin [hydrocodone-acetaminophen]   Patient's last menstrual period was 10/16/2010. ROS:  Feeling well. No dyspnea or chest pain on exertion. No abdominal pain, change in bowel habits, black or bloody stools. No urinary tract symptoms. GYN ROS: no breast pain or new or enlarging lumps on self exam. No neurological complaints. OBJECTIVE:   The patient appears well, alert, oriented x 3, in no distress. Visit Vitals  /68   Pulse 62   Temp 96.7 °F (35.9 °C) (Oral)   Resp 16   Ht 5' 4\" (1.626 m)   Wt 184 lb (83.5 kg)   LMP 10/16/2010   SpO2 98%   BMI 31.58 kg/m²       General appearance: alert, cooperative, no distress, appears stated age  Head: Normocephalic, without obvious abnormality, atraumatic  Ears: normal TM's and external ear canals AU  Throat: Lips, mucosa, and tongue normal. Teeth and gums normal  Neck: supple, symmetrical, trachea midline, no adenopathy, thyroid: not enlarged, symmetric, no tenderness/mass/nodules, no carotid bruit and no JVD  Back: symmetric, no curvature. ROM normal. No CVA tenderness. Lungs: clear to auscultation bilaterally  Breasts: patient declines to have breast exam.  Heart: regular rate and rhythm, S1, S2 normal, no murmur, click, rub or gallop  Abdomen: soft, non-tender. Bowel sounds normal. No masses,  no organomegaly  Extremities: extremities normal, atraumatic, no cyanosis or edema  Pulses: 2+ and symmetric  Skin: Skin color, texture, turgor normal. No rashes or lesions  Neurological is normal, no focal findings.            Lab Results   Component Value Date/Time    WBC 6.4 01/21/2020 07:35 AM    HGB 11.3 (L) 01/21/2020 07:35 AM    HCT 36.3 01/21/2020 07:35 AM    PLATELET 213 34/63/0365 07:35 AM    MCV 94 01/21/2020 07:35 AM         Lab Results   Component Value Date/Time    Sodium 142 01/21/2020 07:35 AM    Potassium 4.8 01/21/2020 07:35 AM    Chloride 103 01/21/2020 07:35 AM    CO2 26 01/21/2020 07:35 AM    Anion gap 13.0 01/21/2020 07:35 AM    Glucose 96 01/21/2020 07:35 AM    BUN 12 01/21/2020 07:35 AM    Creatinine 0.9 01/21/2020 07:35 AM    Calcium 9.0 01/21/2020 07:35 AM         Lab Results   Component Value Date/Time    ALT (SGPT) 16 01/21/2020 07:35 AM    AST (SGOT) 20 01/21/2020 07:35 AM    Alk. phosphatase 75 01/21/2020 07:35 AM    Bilirubin, direct <0.2 01/21/2020 07:35 AM    Bilirubin, total 0.3 01/21/2020 07:35 AM         Lab Results   Component Value Date/Time    Cholesterol, total 238 (H) 01/21/2020 07:35 AM    HDL Cholesterol 55 01/21/2020 07:35 AM    LDL, calculated 149 (H) 01/21/2020 07:35 AM    VLDL, calculated 34 (H) 01/21/2020 07:35 AM    Triglyceride 169 (H) 01/21/2020 07:35 AM         Lab Results   Component Value Date/Time    TSH 7.75 (H) 01/21/2020 07:35 AM    T4, Free 1.1 01/21/2020 07:35 AM         Lab Results   Component Value Date/Time    VITAMIN D, 25-HYDROXY 25.8 (L) 01/21/2020 07:35 AM             ASSESSMENT:   Diagnoses and all orders for this visit:    1. Annual physical exam    2. Other diabetic neurological complication associated with type 2 diabetes mellitus (HCC)  -     gabapentin (NEURONTIN) 300 mg capsule; Take 2 Caps by mouth three (3) times daily. Max Daily Amount: 1,800 mg.    3. Essential hypertension  -     losartan (COZAAR) 25 mg tablet; TAKE 1 TABLET BY MOUTH DAILY    4. Hypothyroidism due to acquired atrophy of thyroid          PLAN:   Mammogram: UTD. DEXA: due Aug 2020  Colonoscopy: UTD  Pap: with her Gyn    Will cont with Lipitor and try tonic water 6 oz at night. Will monitor cramps. Will keep meds as 30 day supply, which she prefers. Will f/u in 4 mon. Labs prior. The plan was discussed with the patient. The patient verbalized understanding and is in agreement with the plan. All medication potential side effects were discussed with the patient.

## 2020-01-24 NOTE — PROGRESS NOTES
Daysi Johnston is a 62 y.o. female (: 1955) presenting to address:    Chief Complaint   Patient presents with    Diabetes    Cholesterol Problem    Thyroid Problem       Vitals:    20 0843   BP: 126/68   Pulse: 62   Resp: 16   Temp: 96.7 °F (35.9 °C)   TempSrc: Oral   SpO2: 98%   Weight: 184 lb (83.5 kg)   Height: 5' 4\" (1.626 m)   PainSc:   0 - No pain   LMP: 10/16/2010       Hearing/Vision:   No exam data present    Learning Assessment:     Learning Assessment 2015   PRIMARY LEARNER Patient   HIGHEST LEVEL OF EDUCATION - PRIMARY LEARNER  2 YEARS OF COLLEGE   BARRIERS PRIMARY LEARNER VISUAL   CO-LEARNER CAREGIVER No   PRIMARY LANGUAGE ENGLISH   LEARNER PREFERENCE PRIMARY DEMONSTRATION   ANSWERED BY self   RELATIONSHIP SELF     Depression Screening:     3 most recent PHQ Screens 2020   Little interest or pleasure in doing things Not at all   Feeling down, depressed, irritable, or hopeless Not at all   Total Score PHQ 2 0     Fall Risk Assessment:     Fall Risk Assessment, last 12 mths 8/10/2018   Able to walk? Yes   Fall in past 12 months? Yes   Fall with injury? No   Number of falls in past 12 months 3   Fall Risk Score 3     Abuse Screening:     Abuse Screening Questionnaire 8/10/2018   Do you ever feel afraid of your partner? N   Are you in a relationship with someone who physically or mentally threatens you? N   Is it safe for you to go home? Y     Coordination of Care Questionaire:   1. Have you been to the ER, urgent care clinic since your last visit? Hospitalized since your last visit? NO    2. Have you seen or consulted any other health care providers outside of the [address] [place] [address] since your last visit? Include any pap smears or colon screening. YES GYN     Advanced Directive:   1. Do you have an Advanced Directive? NO    2. Would you like information on Advanced Directives?  NO

## 2020-06-22 DIAGNOSIS — E11.49 OTHER DIABETIC NEUROLOGICAL COMPLICATION ASSOCIATED WITH TYPE 2 DIABETES MELLITUS (HCC): ICD-10-CM

## 2020-06-23 RX ORDER — PIOGLITAZONE HCL AND METFORMIN HCL 500; 15 MG/1; MG/1
TABLET ORAL
Qty: 180 TAB | Refills: 1 | Status: SHIPPED | OUTPATIENT
Start: 2020-06-23 | End: 2020-12-11 | Stop reason: SDUPTHER

## 2020-07-21 DIAGNOSIS — E03.4 HYPOTHYROIDISM DUE TO ACQUIRED ATROPHY OF THYROID: ICD-10-CM

## 2020-07-21 RX ORDER — LEVOTHYROXINE SODIUM 75 UG/1
TABLET ORAL
Qty: 90 TAB | Refills: 1 | Status: SHIPPED | OUTPATIENT
Start: 2020-07-21 | End: 2020-09-11 | Stop reason: SDUPTHER

## 2020-09-06 LAB
ANION GAP SERPL CALC-SCNC: 14 MMOL/L (ref 3–15)
AVG GLU, 10930: 139 MG/DL (ref 91–123)
BUN SERPL-MCNC: 12 MG/DL (ref 6–22)
CALCIUM SERPL-MCNC: 9.2 MG/DL (ref 8.4–10.5)
CHLORIDE SERPL-SCNC: 104 MMOL/L (ref 98–110)
CHOLEST SERPL-MCNC: 154 MG/DL (ref 110–200)
CO2 SERPL-SCNC: 25 MMOL/L (ref 20–32)
CREAT SERPL-MCNC: 0.7 MG/DL (ref 0.8–1.4)
GFRAA, 66117: >60
GFRNA, 66118: >60
GLUCOSE SERPL-MCNC: 125 MG/DL (ref 70–99)
HBA1C MFR BLD HPLC: 6.5 % (ref 4.8–5.6)
HDLC SERPL-MCNC: 3 MG/DL (ref 0–5)
HDLC SERPL-MCNC: 51 MG/DL
LDL/HDL RATIO,LDHD: 1.3
LDLC SERPL CALC-MCNC: 68 MG/DL (ref 50–99)
NON-HDL CHOLESTEROL, 011976: 103 MG/DL
POTASSIUM SERPL-SCNC: 5 MMOL/L (ref 3.5–5.5)
SODIUM SERPL-SCNC: 143 MMOL/L (ref 133–145)
TRIGL SERPL-MCNC: 174 MG/DL (ref 40–149)
TSH SERPL DL<=0.005 MIU/L-ACNC: 4.96 MCU/ML (ref 0.27–4.2)
VLDLC SERPL CALC-MCNC: 35 MG/DL (ref 8–30)

## 2020-09-11 ENCOUNTER — OFFICE VISIT (OUTPATIENT)
Dept: FAMILY MEDICINE CLINIC | Age: 65
End: 2020-09-11

## 2020-09-11 VITALS
HEART RATE: 77 BPM | HEIGHT: 64 IN | RESPIRATION RATE: 16 BRPM | OXYGEN SATURATION: 99 % | DIASTOLIC BLOOD PRESSURE: 90 MMHG | BODY MASS INDEX: 34.66 KG/M2 | TEMPERATURE: 97.3 F | WEIGHT: 203 LBS | SYSTOLIC BLOOD PRESSURE: 140 MMHG

## 2020-09-11 DIAGNOSIS — M25.561 POSTERIOR KNEE PAIN, RIGHT: ICD-10-CM

## 2020-09-11 DIAGNOSIS — E11.49 OTHER DIABETIC NEUROLOGICAL COMPLICATION ASSOCIATED WITH TYPE 2 DIABETES MELLITUS (HCC): ICD-10-CM

## 2020-09-11 DIAGNOSIS — E03.4 HYPOTHYROIDISM DUE TO ACQUIRED ATROPHY OF THYROID: Primary | ICD-10-CM

## 2020-09-11 DIAGNOSIS — E78.00 PURE HYPERCHOLESTEROLEMIA: ICD-10-CM

## 2020-09-11 DIAGNOSIS — I10 ESSENTIAL HYPERTENSION: ICD-10-CM

## 2020-09-11 DIAGNOSIS — E03.4 HYPOTHYROIDISM DUE TO ACQUIRED ATROPHY OF THYROID: ICD-10-CM

## 2020-09-11 RX ORDER — BISMUTH SUBSALICYLATE 262 MG
1 TABLET,CHEWABLE ORAL DAILY
COMMUNITY

## 2020-09-11 RX ORDER — LEVOTHYROXINE SODIUM 88 UG/1
TABLET ORAL
Qty: 90 TAB | Refills: 1 | Status: SHIPPED | OUTPATIENT
Start: 2020-09-11

## 2020-09-11 NOTE — PATIENT INSTRUCTIONS

## 2020-09-11 NOTE — PROGRESS NOTES
Justo Gongora is a 62 y.o. female (: 1955) presenting to address:    Chief Complaint   Patient presents with    Knee Pain     right side x a couple of months and not improving .  Thyroid Problem    Diabetes       Vitals:    20 0841   BP: 140/90   Pulse: 77   Resp: 16   Temp: 97.3 °F (36.3 °C)   TempSrc: Oral   SpO2: 99%   Weight: 203 lb (92.1 kg)   Height: 5' 4\" (1.626 m)   PainSc:   7   PainLoc: Knee   LMP: 10/16/2010       Hearing/Vision:   No exam data present    Learning Assessment:     Learning Assessment 2015   PRIMARY LEARNER Patient   HIGHEST LEVEL OF EDUCATION - PRIMARY LEARNER  2 YEARS OF COLLEGE   BARRIERS PRIMARY LEARNER VISUAL   CO-LEARNER CAREGIVER No   PRIMARY LANGUAGE ENGLISH   LEARNER PREFERENCE PRIMARY DEMONSTRATION   ANSWERED BY self   RELATIONSHIP SELF     Depression Screening:     3 most recent PHQ Screens 2020   Little interest or pleasure in doing things Not at all   Feeling down, depressed, irritable, or hopeless Not at all   Total Score PHQ 2 0     Fall Risk Assessment:     Fall Risk Assessment, last 12 mths 8/10/2018   Able to walk? Yes   Fall in past 12 months? Yes   Fall with injury? No   Number of falls in past 12 months 3   Fall Risk Score 3     Abuse Screening:     Abuse Screening Questionnaire 8/10/2018   Do you ever feel afraid of your partner? N   Are you in a relationship with someone who physically or mentally threatens you? N   Is it safe for you to go home? Y     Coordination of Care Questionaire:   1. Have you been to the ER, urgent care clinic since your last visit? Hospitalized since your last visit? NO    2. Have you seen or consulted any other health care providers outside of the [address] [place] [address] since your last visit? Include any pap smears or colon screening. YES    Advanced Directive:   1. Do you have an Advanced Directive? NO    2. Would you like information on Advanced Directives?  NO

## 2020-09-11 NOTE — PROGRESS NOTES
Assessment/Plan:    *Diagnoses and all orders for this visit:    1. Hypothyroidism due to acquired atrophy of thyroid  -     levothyroxine (SYNTHROID) 88 mcg tablet; TAKE 1 TABLET BY MOUTH DAILY BEFORE BREAKFAST  -     TSH 3RD GENERATION; Future  -     T4, FREE; Future    2. Posterior knee pain, right  -     DUPLEX LOWER EXT VENOUS RIGHT; Future    3. Other diabetic neurological complication associated with type 2 diabetes mellitus ([facility]    4. Essential hypertension    5. Pure hypercholesterolemia        F/u 3 months with BW prior. Will increase Synthroid to 88 mcg. Awaiting result of doppler. May refer out if her pain persists. The plan was discussed with the patient. The patient verbalized understanding and is in agreement with the plan. All medication potential side effects were discussed with the patient.    -------------------------------------------------------------------------------------------------------------------        Niranjan Ramirez is a 62 y.o. female and presents with Knee Pain (right side x a couple of months and not improving . ); Thyroid Problem; and Diabetes         Subjective:  Pt here for f/u. TSH is still elevated. On 75 mcg every day. DM:  A1c 6.5%. Great. HLD:  Much better now that she is back on medication. TGs still a little elevated. She eats more sweets than she should. Has had pain and a swelling behind the RT knee x 2 months. Is impacting her ability to walk, has pain in this area, not getting better. Has applied ice but no change. Review of Systems - General ROS: negative         The problem list was updated as a part of today's visit.   Patient Active Problem List   Diagnosis Code    HTN (hypertension) I10    Hyperlipidemia E78.5    Goiter E04.9    Sleep apnea G47.30    GERD (gastroesophageal reflux disease) K21.9    Macular degeneration H35.30    Fatty liver K76.0    Allergic rhinitis J30.9    Family history of colon cancer Z80.0    Peripheral neuropathy G62.9    Hypothyroidism E03.9    Microalbum[facility] due to type 2 [facility] E11.29, R80.9    Diabetic neuropathy (HCC) E11.40    Type 2 diabetes mellitus with proliferative retinopathy ([facility] I87.4398       The PSH, FH were reviewed. SH:  Social History     Tobacco Use    Smoking status: Former Smoker     Packs/day: 0.10     Years: 0.50     Pack years: 0.05     Types: Cigarettes     Last attempt to quit: 1975     Years since quittin.7    Smokeless tobacco: Never Used   Substance Use Topics    Alcohol use: Yes     Comment: 3 glasses of wine per year    Drug use: No       Medications/Allergies:  Current Outpatient Medications on File Prior to Visit   Medication Sig Dispense Refill    multivitamin (ONE A DAY) tablet Take 1 Tab by mouth daily.  gabapentin (NEURONTIN) 300 mg capsule TAKE 2 CAPSULES BY MOUTH THREE TIMES DAILY. MAX DAILY AMOUNT: 1800  Cap 3    losartan (COZAAR) 25 mg tablet TAKE 1 TABLET BY MOUTH DAILY 30 Tab 2    atorvastatin (LIPITOR) 10 mg tablet TAKE 1 TABLET BY MOUTH DAILY 90 Tab 1    pioglitazone-metFORMIN (ACTOPLUS MET)  mg per tablet TAKE 1 TABLET BY MOUTH TWICE DAILY WITH MEALS 180 Tab 1    furosemide (LASIX) 20 mg tablet TAKE 1 TABLET BY MOUTH DAILY (Patient taking differently: as needed.) 90 Tab 1    B.infantis-B.ani-B.long-B.bifi (PROBIOTIC 4X) 10-15 mg TbEC Take  by mouth daily.  OMEPRAZOLE (PRILOSEC PO) Take 20 mg by mouth daily.  [DISCONTINUED] levothyroxine (SYNTHROID) 75 mcg tablet TAKE 1 TABLET BY MOUTH DAILY BEFORE BREAKFAST 90 Tab 1    OMEGA-3 FATTY ACIDS-VITAMIN E PO Take 1,000 mg by mouth. No current facility-administered medications on file prior to visit.          Allergies   Allergen Reactions    Ace Inhibitors Anaphylaxis    Anectine [Succinylcholine Chloride] Shortness of Breath     With rash    Iodinated Contrast Media Hives    Vicodin [Hydrocodone-Acetaminophen] Itching Health Maintenance:   Health Maintenance   Topic Date Due    Shingrix Vaccine Age 49> (1 of 2) 12/12/2006    Foot Exam Q1  10/05/2019    PAP AKA CERVICAL CYTOLOGY  10/05/2019    Flu Vaccine (1) 09/01/2020    MICROALBUMIN Q1  01/21/2021    A1C test (Diabetic or Prediabetic)  09/04/2021    Lipid Screen  09/04/2021    Breast Cancer Screen Mammogram  09/13/2021    Eye Exam Retinal or Dilated  09/25/2021    Colonoscopy  08/23/2023    DTaP/Tdap/Td series (2 - Td) 06/23/2025    Hepatitis C Screening  Completed    Bone Densitometry (Dexa) Screening  Completed    Pneumococcal 0-64 years  Completed       Objective:  Visit Vitals  /90   Pulse 77   Temp 97.3 °F (36.3 °C) (Oral)   Resp 16   Ht 5' 4\" (1.626 m)   Wt 203 lb (92.1 kg)   LMP 10/16/2010   SpO2 99%   BMI 34.84 kg/m²          Nurses notes and VS reviewed. Physical Examination: General appearance - alert, well appearing, and in no distress  Chest - clear to auscultation, no wheezes, rales or rhonchi, symmetric air entry  Heart - normal rate, regular rhythm, normal S1, S2, no murmurs, rubs, clicks or gallops  Extremities - peripheral pulses normal, no pedal edema, no clubbing or cyanosis, + swelling behind the RT knee, size approx of a ping-pong ball, TTP. Labwork and Ancillary Studies:    CBC w/Diff  Lab Results   Component Value Date/Time    WBC 6.4 01/21/2020 07:35 AM    HGB 11.3 (L) 01/21/2020 07:35 AM    PLATELET 174 31/35/8153 07:35 AM         Basic Metabolic Profile  Lab Results   Component Value Date/Time    Sodium 143 09/04/2020 07:37 AM    Potassium 5.0 09/04/2020 07:37 AM    Chloride 104 09/04/2020 07:37 AM    CO2 25 09/04/2020 07:37 AM    Anion gap 14.0 09/04/2020 07:37 AM    Glucose 125 (H) 09/04/2020 07:37 AM    BUN 12 09/04/2020 07:37 AM    Creatinine 0.7 (L) 09/04/2020 07:37 AM    Calcium 9.2 09/04/2020 07:37 AM         LFT  Lab Results   Component Value Date/Time    ALT (SGPT) 16 01/21/2020 07:35 AM    Alk. phosphatase 75 01/21/2020 07:35 AM    Bilirubin, direct <0.2 01/21/2020 07:35 AM    Bilirubin, total 0.3 01/21/2020 07:35 AM         Cholesterol  Lab Results   Component Value Date/Time    Cholesterol, total 154 09/04/2020 07:37 AM    HDL Cholesterol 51 09/04/2020 07:37 AM    LDL, calculated 68 09/04/2020 07:37 AM    Triglyceride 174 (H) 09/04/2020 07:37 AM

## 2020-09-28 ENCOUNTER — TELEPHONE (OUTPATIENT)
Dept: FAMILY MEDICINE CLINIC | Age: 65
End: 2020-09-28

## 2020-09-29 ENCOUNTER — TELEPHONE (OUTPATIENT)
Dept: FAMILY MEDICINE CLINIC | Age: 65
End: 2020-09-29

## 2020-09-29 NOTE — TELEPHONE ENCOUNTER
Call pt. Notify her that she does not have any blood clots in the leg but they did confirm that she has venous reflux in the RT leg. That would explain the swelling she has noted. We typically manage this with compression socks (which should be worn daily,), leg elevation, and continue the Lasix as needed. There was no cyst behind the knee as I initially thought. If the swelling becomes too unmanageable, we can always refer her for lymphedema therapy.

## 2020-09-29 NOTE — TELEPHONE ENCOUNTER
Pt is calling to get the results from ultrasound that was done last Wednesday [facility]

## 2020-09-30 NOTE — TELEPHONE ENCOUNTER
Reached pt to give results. Went over dr santoro a few times with her. She is relieved it's not a cyst but says the pain is still bad. She is not taking lasix daily so she will try that. She has compression hose \"that go all the way up\" at home. She knows to wear those daytime only and elevate. She will try this a few days and call back if she decides to try lymphedema therapy.

## 2020-10-05 ENCOUNTER — CLINICAL SUPPORT (OUTPATIENT)
Dept: FAMILY MEDICINE CLINIC | Age: 65
End: 2020-10-05
Payer: COMMERCIAL

## 2020-10-05 DIAGNOSIS — Z23 NEEDS FLU SHOT: Primary | ICD-10-CM

## 2020-10-05 PROCEDURE — 90686 IIV4 VACC NO PRSV 0.5 ML IM: CPT | Performed by: INTERNAL MEDICINE

## 2020-10-05 PROCEDURE — 90471 IMMUNIZATION ADMIN: CPT | Performed by: INTERNAL MEDICINE

## 2020-10-05 NOTE — PROGRESS NOTES
Flu shot Immunization/s administered 10/5/2020 by Tracy Barney LPN with guardian's consent. Patient tolerated procedure well. No reactions noted.

## 2020-10-14 DIAGNOSIS — M25.561 POSTERIOR KNEE PAIN, RIGHT: ICD-10-CM

## 2020-11-18 DIAGNOSIS — I10 ESSENTIAL HYPERTENSION: ICD-10-CM

## 2020-11-18 NOTE — TELEPHONE ENCOUNTER
Last seen 09/28/2020    Last refill 08/20/2020 30tabs 2 refill     Future Appointments   Date Time Provider Jeffry Bustamante   12/3/2020  8:00 AM [email or web address]   12/11/2020 10:30 AM David Ledesma MD [facility] AM[facility]

## 2020-11-19 RX ORDER — LOSARTAN POTASSIUM 25 MG/1
TABLET ORAL
Qty: 30 TAB | Refills: 5 | Status: SHIPPED | OUTPATIENT
Start: 2020-11-19

## 2020-12-03 ENCOUNTER — APPOINTMENT (OUTPATIENT)
Dept: FAMILY MEDICINE CLINIC | Age: 65
End: 2020-12-03

## 2020-12-04 LAB
T4 FREE SERPL-MCNC: 1.5 NG/DL (ref 0.9–1.8)
TSH SERPL DL<=0.005 MIU/L-ACNC: 3.09 MCU/ML (ref 0.27–4.2)

## 2020-12-11 ENCOUNTER — OFFICE VISIT (OUTPATIENT)
Dept: FAMILY MEDICINE CLINIC | Age: 65
End: 2020-12-11
Payer: COMMERCIAL

## 2020-12-11 VITALS
OXYGEN SATURATION: 97 % | BODY MASS INDEX: 33.63 KG/M2 | HEART RATE: 66 BPM | DIASTOLIC BLOOD PRESSURE: 79 MMHG | SYSTOLIC BLOOD PRESSURE: 116 MMHG | TEMPERATURE: 96.6 F | HEIGHT: 64 IN | RESPIRATION RATE: 16 BRPM | WEIGHT: 197 LBS

## 2020-12-11 DIAGNOSIS — E11.49 OTHER DIABETIC NEUROLOGICAL COMPLICATION ASSOCIATED WITH TYPE 2 DIABETES MELLITUS (HCC): ICD-10-CM

## 2020-12-11 DIAGNOSIS — E03.4 HYPOTHYROIDISM DUE TO ACQUIRED ATROPHY OF THYROID: Primary | ICD-10-CM

## 2020-12-11 DIAGNOSIS — I10 ESSENTIAL HYPERTENSION: ICD-10-CM

## 2020-12-11 PROCEDURE — 99213 OFFICE O/P EST LOW 20 MIN: CPT | Performed by: INTERNAL MEDICINE

## 2020-12-11 RX ORDER — ASCORBIC ACID 500 MG
TABLET ORAL DAILY
COMMUNITY

## 2020-12-11 RX ORDER — GLUCOSAMINE SULFATE 1500 MG
POWDER IN PACKET (EA) ORAL DAILY
COMMUNITY

## 2020-12-11 RX ORDER — PIOGLITAZONE HCL AND METFORMIN HCL 500; 15 MG/1; MG/1
TABLET ORAL
Qty: 180 TAB | Refills: 1 | Status: SHIPPED | OUTPATIENT
Start: 2020-12-11

## 2020-12-11 RX ORDER — GABAPENTIN 300 MG/1
CAPSULE ORAL
Qty: 540 CAP | Refills: 1 | Status: SHIPPED | OUTPATIENT
Start: 2020-12-11

## 2020-12-11 RX ORDER — LANOLIN ALCOHOL/MO/W.PET/CERES
500 CREAM (GRAM) TOPICAL DAILY
COMMUNITY

## 2020-12-11 NOTE — PROGRESS NOTES
Brianna Dolan is a 62 y.o. female (: 1955) presenting to address:    Chief Complaint   Patient presents with    Thyroid Problem       Vitals:    20 1042   BP: 116/79   Pulse: 66   Resp: 16   Temp: (!) 96.6 °F (35.9 °C)   TempSrc: Temporal   SpO2: 97%   Weight: 197 lb (89.4 kg)   Height: 5' 4\" (1.626 m)   PainSc:   0 - No pain   LMP: 10/16/2010       Hearing/Vision:   No exam data present    Learning Assessment:     Learning Assessment 2015   PRIMARY LEARNER Patient   HIGHEST LEVEL OF EDUCATION - PRIMARY LEARNER  2 YEARS OF COLLEGE   BARRIERS PRIMARY LEARNER VISUAL   CO-LEARNER CAREGIVER No   PRIMARY LANGUAGE ENGLISH   LEARNER PREFERENCE PRIMARY DEMONSTRATION   ANSWERED BY self   RELATIONSHIP SELF     Depression Screening:     3 most recent PHQ Screens 2020   Little interest or pleasure in doing things Not at all   Feeling down, depressed, irritable, or hopeless Not at all   Total Score PHQ 2 0     Fall Risk Assessment:     Fall Risk Assessment, last 12 mths 8/10/2018   Able to walk? Yes   Fall in past 12 months? Yes   Fall with injury? No   Number of falls in past 12 months 3   Fall Risk Score 3     Abuse Screening:     Abuse Screening Questionnaire 8/10/2018   Do you ever feel afraid of your partner? N   Are you in a relationship with someone who physically or mentally threatens you? N   Is it safe for you to go home? Y     Coordination of Care Questionaire:   1. Have you been to the ER, urgent care clinic since your last visit? Hospitalized since your last visit? NO    2. Have you seen or consulted any other health care providers outside of the [address] [place] [address] since your last visit? Include any pap smears or colon screening. NO    Advanced Directive:   1. Do you have an Advanced Directive? NO    2. Would you like information on Advanced Directives?  NO

## 2020-12-11 NOTE — PATIENT INSTRUCTIONS

## 2020-12-11 NOTE — PROGRESS NOTES
Assessment/Plan:    *Diagnoses and all orders for this visit:    1. Hypothyroidism due to acquired atrophy of thyroid    2. Essential hypertension    3. Other diabetic neurological complication associated with type 2 diabetes mellitus (HCC)  -     gabapentin (NEURONTIN) 300 mg capsule; TAKE 2 CAPSULES BY MOUTH THREE TIMES DAILY. MAX DAILY AMOUNT: 1800 MG  -     pioglitazone-metFORMIN (ACTOPLUS MET)  mg per tablet; TAKE 1 TABLET BY MOUTH TWICE DAILY WITH MEALS        F/u 4 months. BW. The plan was discussed with the patient. The patient verbalized understanding and is in agreement with the plan. All medication potential side effects were discussed with the patient.    -------------------------------------------------------------------------------------------------------------------        Darinel Eaton is a 62 y.o. female and presents with Thyroid Problem         Subjective:  Pt seen for f/u. Hypothyroidism: We changed her dose last visit and her TSH is now in range. HTN:  Well controlled. Review of Systems - General ROS: negative         The problem list was updated as a part of today's visit. Patient Active Problem List   Diagnosis Code    HTN (hypertension) I10    Hyperlipidemia E78.5    Goiter E04.9    Sleep apnea G47.30    GERD (gastroesophageal reflux disease) K21.9    Macular degeneration H35.30    Fatty liver K76.0    Allergic rhinitis J30.9    Family history of colon cancer Z80.0    Peripheral neuropathy G62.9    Hypothyroidism E03.9    Microalbuminuria due to type 2 diabetes mellitus ([address] E11.29, R80.9    Diabetic neuropathy ([facility] E11.40    Type 2 diabetes mellitus with proliferative retinopathy ([facility] T76.2270       The PSH, FH were reviewed.         SH:  Social History     Tobacco Use    Smoking status: Former Smoker     Packs/day: 0.10     Years: 0.50     Pack years: 0.05     Types: Cigarettes     Last attempt to quit: 1975     Years since quittin.9  Smokeless tobacco: Never Used   Substance Use Topics    Alcohol use: Yes     Comment: 3 glasses of wine per year    Drug use: No       Medications/Allergies:  Current Outpatient Medications on File Prior to Visit   Medication Sig Dispense Refill    cholecalciferol (Vitamin D3) 25 mcg (1,000 unit) cap Take  by mouth daily.  cyanocobalamin 1,000 mcg tablet Take 500 mcg by mouth daily.  ascorbic acid, vitamin C, (Vitamin C) 500 mg tablet Take  by mouth daily.  losartan (COZAAR) 25 mg tablet TAKE 1 TABLET BY MOUTH DAILY 30 Tab 5    multivitamin (ONE A DAY) tablet Take 1 Tab by mouth daily.  levothyroxine (SYNTHROID) 88 mcg tablet TAKE 1 TABLET BY MOUTH DAILY BEFORE BREAKFAST 90 Tab 1    atorvastatin (LIPITOR) 10 mg tablet TAKE 1 TABLET BY MOUTH DAILY 90 Tab 1    furosemide (LASIX) 20 mg tablet TAKE 1 TABLET BY MOUTH DAILY (Patient taking differently: as needed.) 90 Tab 1    B.infantis-B.ani-B.long-B.bifi (PROBIOTIC 4X) 10-15 mg TbEC Take  by mouth daily.  OMEPRAZOLE (PRILOSEC PO) Take 20 mg by mouth daily.  [DISCONTINUED] gabapentin (NEURONTIN) 300 mg capsule TAKE 2 CAPSULES BY MOUTH THREE TIMES DAILY. MAX DAILY AMOUNT: 1800  Cap 3    [DISCONTINUED] pioglitazone-metFORMIN (ACTOPLUS MET)  mg per tablet TAKE 1 TABLET BY MOUTH TWICE DAILY WITH MEALS 180 Tab 1    OMEGA-3 FATTY ACIDS-VITAMIN E PO Take 1,000 mg by mouth. No current facility-administered medications on file prior to visit.          Allergies   Allergen Reactions    Ace Inhibitors Anaphylaxis    Anectine [Succinylcholine Chloride] Shortness of Breath     With rash    Iodinated Contrast Media Hives    Vicodin [Hydrocodone-Acetaminophen] Itching         Health Maintenance:   Health Maintenance   Topic Date Due    Shingrix Vaccine Age 50> (1 of 2) 12/12/2006    Foot Exam Q1  10/05/2019    PAP AKA CERVICAL CYTOLOGY  10/05/2019    MICROALBUMIN Q1  01/21/2021    A1C test (Diabetic or Prediabetic) 09/04/2021    Lipid Screen  09/04/2021    Breast Cancer Screen Mammogram  09/13/2021    Eye Exam Retinal or Dilated  09/30/2022    Colorectal Cancer Screening Combo  08/23/2023    DTaP/Tdap/Td series (2 - Td) 06/23/2025    Hepatitis C Screening  Completed    Bone Densitometry (Dexa) Screening  Completed    Flu Vaccine  Completed    Pneumococcal 0-64 years  Completed       Objective:  Visit Vitals  /79   Pulse 66   Temp (!) 96.6 °F (35.9 °C) (Temporal)   Resp 16   Ht 5' 4\" (1.626 m)   Wt 197 lb (89.4 kg)   LMP 10/16/2010   SpO2 97%   BMI 33.81 kg/m²          Nurses notes and VS reviewed. Physical Examination: General appearance - alert, well appearing, and in no distress  Chest - clear to auscultation, no wheezes, rales or rhonchi, symmetric air entry  Heart - normal rate, regular rhythm, normal S1, S2, no murmurs, rubs, clicks or gallops          Labwork and Ancillary Studies:    CBC w/Diff  Lab Results   Component Value Date/Time    WBC 6.4 01/21/2020 07:35 AM    HGB 11.3 (L) 01/21/2020 07:35 AM    PLATELET 979 93/00/2666 07:35 AM         Basic Metabolic Profile  Lab Results   Component Value Date/Time    Sodium 143 09/04/2020 07:37 AM    Potassium 5.0 09/04/2020 07:37 AM    Chloride 104 09/04/2020 07:37 AM    CO2 25 09/04/2020 07:37 AM    Anion gap 14.0 09/04/2020 07:37 AM    Glucose 125 (H) 09/04/2020 07:37 AM    BUN 12 09/04/2020 07:37 AM    Creatinine 0.7 (L) 09/04/2020 07:37 AM    Calcium 9.2 09/04/2020 07:37 AM         LFT  Lab Results   Component Value Date/Time    ALT (SGPT) 16 01/21/2020 07:35 AM    Alk.  phosphatase 75 01/21/2020 07:35 AM    Bilirubin, direct <0.2 01/21/2020 07:35 AM    Bilirubin, total 0.3 01/21/2020 07:35 AM         Cholesterol  Lab Results   Component Value Date/Time    Cholesterol, total 154 09/04/2020 07:37 AM    HDL Cholesterol 51 09/04/2020 07:37 AM    LDL, calculated 68 09/04/2020 07:37 AM    Triglyceride 174 (H) 09/04/2020 07:37 AM

## 2021-02-15 RX ORDER — ATORVASTATIN CALCIUM 10 MG/1
TABLET, FILM COATED ORAL
Qty: 90 TAB | Refills: 0 | Status: SHIPPED | OUTPATIENT
Start: 2021-02-15

## 2023-09-05 NOTE — MR AVS SNAPSHOT
[address] [place] [address]  [address] [id number] [phone] Patient: Erich Sneed MRN: [id number] :1955 Visit Information Date & Time Provider Department Dept. Phone Encounter #  
 8/10/2018  8:00 AM Renetta Pollard MD [address] [place] [address] [phone] [id number] Upcoming Health Maintenance Date Due ZOSTER VACCINE AGE 60> 10/12/2016 EYE EXAM RETINAL OR DILATED Q1 3/16/2017 BREAST CANCER SCRN MAMMOGRAM 2017 MICROALBUMIN Q1 11/3/2017 FOOT EXAM Q1 3/31/2018 Influenza Age 5 to Adult 2018 COLONOSCOPY 2018 HEMOGLOBIN A1C Q6M 2018 LIPID PANEL Q1 11/10/2018 PAP AKA CERVICAL CYTOLOGY 10/5/2019 DTaP/Tdap/Td series (2 - Td) 2025 Allergies as of 8/10/2018  Review Complete On: 8/10/2018 By: Renetta [place] MD  
  
 Severity Noted Reaction Type Reactions Ace Inhibitors High 10/20/2010   Systemic Anaphylaxis Anectine [Succinylcholine Chloride] High 10/20/2010    Shortness of Breath With rash Iodinated Contrast- Oral And Iv Dye High 10/20/2010   Systemic Hives Vicodin [Hydrocodone-acetaminophen]  10/14/2014    Itching Current Immunizations  Reviewed on 2017 Name Date Influenza Vaccine 10/29/2015 Influenza Vaccine (Quad) PF 2017  3:57 PM, 11/15/2016  3:57 PM  
 Influenza Vaccine Split 2012 Pneumococcal Polysaccharide (PPSV-23) 11/15/2016  3:56 PM  
 Tdap 2015  3:43 PM  
  
 Not reviewed this visit You Were Diagnosed With   
  
 Codes Comments Microalbuminuria due to type 2 diabetes mellitus ([facility]    -  Primary ICD-10-CM: E11.29, R80.9 ICD-9-CM: 250.00, 791.0 Essential hypertension     ICD-10-CM: I10 
ICD-9-CM: 401.9 Pure hypercholesterolemia     ICD-10-CM: E78.00 ICD-9-CM: 272.0 Hypothyroidism due to acquired atrophy of thyroid     ICD-10-CM: E03.4 ICD-9-CM: 244.8, 246.8 Left foot pain     ICD-10-CM: G07.945 ICD-9-CM: 729.5 Vitals BP Pulse Temp Resp Height(growth percentile) Weight(growth percentile) 140/80 (BP 1 Location: Left arm, BP Patient Position: Sitting) 64 98 °F (36.7 °C) (Oral) 18 5' 4\" (1.626 m) 177 lb (80.3 kg) LMP SpO2 BMI OB Status Smoking Status 10/16/2010 98% 30.38 kg/m2 Hysterectomy Former Smoker Vitals History BMI and BSA Data Body Mass Index Body Surface Area  
 30.38 kg/m 2 1.9 m 2 Preferred Pharmacy Pharmacy Name Phone [address], [address]  [facility] [phone] Your Updated Medication List  
  
   
This list is accurate as of 8/10/18  9:01 AM.  Always use your most recent med list.  
  
  
  
  
 albuterol 90 mcg/actuation inhaler Commonly known as:  PROAIR HFA Take 2 Puffs by inhalation every six (6) hours as needed for Wheezing or Shortness of Breath. aspirin 81 mg tablet Take  by mouth daily. CINNAMON 500 mg Cap Generic drug:  cinnamon bark Take  by mouth. furosemide 20 mg tablet Commonly known as:  LASIX Take 1 Tab by mouth daily as needed. gabapentin 300 mg capsule Commonly known as:  NEURONTIN  
TAKE 2 CAPSULES BY MOUTH THREE TIMES DAILY  
  
 levothyroxine 75 mcg tablet Commonly known as:  SYNTHROID  
TAKE 1 TABLET BY MOUTH DAILY BEFORE BREAKFAST  
  
 losartan 25 mg tablet Commonly known as:  COZAAR  
TAKE 1 TABLET BY MOUTH DAILY pioglitazone-metFORMIN  mg per tablet Commonly known as:  ACTOPLUS MET  
TAKE 1 TABLET BY MOUTH TWICE DAILY WITH MEALS  
  
 PRILOSEC PO Take  by mouth daily. PROBIOTIC 4X 10-15 mg Tbec Generic drug:  B.infantis-B.ani-B.long-B.bifi Take  by mouth daily. simvastatin 40 mg tablet Commonly known as:  ZOCOR  
TAKE 1 TABLET BY MOUTH EVERY EVENING  
  
 VITAMIN B-12 2,500 mcg sublingual tablet Generic drug:  cyanocobalamin Take 2,500 mcg by mouth daily. We Performed the Following AMB POC HEMOGLOBIN A1C [97358 CPT(R)] AMB POC URINE, MICROALBUMIN, SEMIQUANT (3 RESULTS) [48614 CPT(R)] REFERRAL TO PODIATRY [REF90 Custom] Referral Information Referral ID Referred By Referred To  
  
 [id number] Fe[facility] [address] [place] [address] [address] [place], [address] [place] [address] Phone: [phone] Fax: [phone] Visits Status Start Date End Date 1 New Request 8/10/18 8/10/19 If your referral has a status of pending review or denied, additional information will be sent to support the outcome of this decision. Patient Instructions Learning About Diabetes Food Guidelines Your Care Instructions Meal planning is important to manage diabetes. It helps keep your blood sugar at a target level (which you set with your doctor). You don't have to eat special foods. You can eat what your family eats, including sweets once in a while. But you do have to pay attention to how often you eat and how much you eat of certain foods. You may want to work with a dietitian or a certified diabetes educator (CDE) to help you plan meals and snacks. A dietitian or CDE can also help you lose weight if that is one of your goals. What should you know about eating carbs? Managing the amount of carbohydrate (carbs) you eat is an important part of healthy meals when you have diabetes. Carbohydrate is found in many foods. · Learn which foods have carbs. And learn the amounts of carbs in different foods. ¨ Bread, cereal, pasta, and rice have about 15 grams of carbs in a serving. A serving is 1 slice of bread (1 ounce), ½ cup of cooked cereal, or 1/3 cup of cooked pasta or rice. ¨ Fruits have 15 grams of carbs in a serving.  A serving is 1 small fresh fruit, such as an apple or orange; ½ of a banana; ½ cup of cooked or canned fruit; ½ cup of fruit juice; 1 cup of melon or raspberries; or 2 tablespoons of dried fruit. ¨ Milk and no-sugar-added yogurt have 15 grams of carbs in a serving. A serving is 1 cup of milk or 2/3 cup of no-sugar-added yogurt. ¨ Starchy vegetables have 15 grams of carbs in a serving. A serving is ½ cup of mashed potatoes or sweet potato; 1 cup winter squash; ½ of a small baked potato; ½ cup of cooked beans; or ½ cup cooked corn or green peas. · Learn how much carbs to eat each day and at each meal. A dietitian or CDE can teach you how to keep track of the amount of carbs you eat. This is called carbohydrate counting. · If you are not sure how to count carbohydrate grams, use the Plate Method to plan meals. It is a good, quick way to make sure that you have a balanced meal. It also helps you spread carbs throughout the day. ¨ Divide your plate by types of foods. Put non-starchy vegetables on half the plate, meat or other protein food on one-quarter of the plate, and a grain or starchy vegetable in the final quarter of the plate. To this you can add a small piece of fruit and 1 cup of milk or yogurt, depending on how many carbs you are supposed to eat at a meal. 
· Try to eat about the same amount of carbs at each meal. Do not \"save up\" your daily allowance of carbs to eat at one meal. 
· Proteins have very little or no carbs per serving. Examples of proteins are beef, chicken, turkey, fish, eggs, tofu, cheese, cottage cheese, and peanut butter. A serving size of meat is 3 ounces, which is about the size of a deck of cards. Examples of meat substitute serving sizes (equal to 1 ounce of meat) are 1/4 cup of cottage cheese, 1 egg, 1 tablespoon of peanut butter, and ½ cup of tofu. How can you eat out and still eat healthy? · Learn to estimate the serving sizes of foods that have carbohydrate. If you measure food at home, it will be easier to estimate the amount in a serving of restaurant food. · If the meal you order has too much carbohydrate (such as potatoes, corn, or baked beans), ask to have a low-carbohydrate food instead. Ask for a salad or green vegetables. · If you use insulin, check your blood sugar before and after eating out to help you plan how much to eat in the future. · If you eat more carbohydrate at a meal than you had planned, take a walk or do other exercise. This will help lower your blood sugar. What else should you know? · Limit saturated fat, such as the fat from meat and dairy products. This is a healthy choice because people who have diabetes are at higher risk of heart disease. So choose lean cuts of meat and nonfat or low-fat dairy products. Use olive or canola oil instead of butter or shortening when cooking. · Don't skip meals. Your blood sugar may drop too low if you skip meals and take insulin or certain medicines for diabetes. · Check with your doctor before you drink alcohol. Alcohol can cause your blood sugar to drop too low. Alcohol can also cause a bad reaction if you take certain diabetes medicines. Follow-up care is a key part of your treatment and safety. Be sure to make and go to all appointments, and call your doctor if you are having problems. It's also a good idea to know your test results and keep a list of the medicines you take. Where can you learn more? Go to http://junito-grace.info/. Enter X535 in the search box to learn more about \"Learning About Diabetes Food Guidelines. \" Current as of: December 7, 2017 Content Version: 11.7 © 4486-2127 [employer]. Care instructions adapted under license by [employer] (which disclaims liability or warranty for this information). If you have questions about a medical condition or this instruction, always ask your healthcare professional. [place] [address] any warranty or liability for your use of this information. Introducing [facility]! Caro Freed introduces [employer] patient portal. Now you can access parts of your medical record, email your doctor's office, and request medication refills online. 1. In your internet browser, go to [email or web address] 2. Click on the First Time User? Click Here link in the Sign In box. You will see the New Member Sign Up page. 3. Enter your [employer] Access Code exactly as it appears below. You will not need to use this code after youve completed the sign-up process. If you do not sign up before the expiration date, you must request a new code. · [employer] Access Code: [email or web address] Expires: 11/8/2018  9:01 AM 
 
4. Enter the last four digits of your Social Security Number (xxxx) and Date of Birth (mm/dd/yyyy) as indicated and click Submit. You will be taken to the next sign-up page. 5. Create a Toutiao ID. This will be your [employer] login ID and cannot be changed, so think of one that is secure and easy to remember. 6. Create a [employer] password. You can change your password at any time. 7. Enter your Password Reset Question and Answer. This can be used at a later time if you forget your password. 8. Enter your e-mail address. You will receive e-mail notification when new information is available in [address] 9. Click Sign Up. You can now view and download portions of your medical record. 10. Click the Download Summary menu link to download a portable copy of your medical information. If you have questions, please visit the Frequently Asked Questions section of the [employer] website. Remember, [employer] is NOT to be used for urgent needs. For medical emergencies, dial 911. Now available from your iPhone and Android! Please provide this summary of care documentation to your next provider. Your primary care clinician is listed as Steph 51. If you have any questions after today's visit, please call [phone]. Topical Metronidazole Counseling: Metronidazole is a topical antibiotic medication. You may experience burning, stinging, redness, or allergic reactions.  Please call our office if you develop any problems from using this medication.